# Patient Record
Sex: FEMALE | Race: WHITE | NOT HISPANIC OR LATINO | URBAN - METROPOLITAN AREA
[De-identification: names, ages, dates, MRNs, and addresses within clinical notes are randomized per-mention and may not be internally consistent; named-entity substitution may affect disease eponyms.]

---

## 2017-04-23 ENCOUNTER — INPATIENT (INPATIENT)
Facility: HOSPITAL | Age: 72
LOS: 3 days | Discharge: PSYCHIATRIC OR SUBSTANCE USE FACILITY | DRG: 918 | End: 2017-04-27
Attending: INTERNAL MEDICINE | Admitting: INTERNAL MEDICINE
Payer: MEDICARE

## 2017-04-23 VITALS
OXYGEN SATURATION: 99 % | RESPIRATION RATE: 20 BRPM | HEART RATE: 91 BPM | HEIGHT: 64 IN | SYSTOLIC BLOOD PRESSURE: 138 MMHG | WEIGHT: 160.06 LBS | TEMPERATURE: 98 F | DIASTOLIC BLOOD PRESSURE: 83 MMHG

## 2017-04-23 DIAGNOSIS — T39.1X2A POISONING BY 4-AMINOPHENOL DERIVATIVES, INTENTIONAL SELF-HARM, INITIAL ENCOUNTER: ICD-10-CM

## 2017-04-23 DIAGNOSIS — R69 ILLNESS, UNSPECIFIED: ICD-10-CM

## 2017-04-23 DIAGNOSIS — F31.63 BIPOLAR DISORDER, CURRENT EPISODE MIXED, SEVERE, WITHOUT PSYCHOTIC FEATURES: ICD-10-CM

## 2017-04-23 DIAGNOSIS — F10.10 ALCOHOL ABUSE, UNCOMPLICATED: ICD-10-CM

## 2017-04-23 DIAGNOSIS — F11.10 OPIOID ABUSE, UNCOMPLICATED: ICD-10-CM

## 2017-04-23 DIAGNOSIS — F32.2 MAJOR DEPRESSIVE DISORDER, SINGLE EPISODE, SEVERE WITHOUT PSYCHOTIC FEATURES: ICD-10-CM

## 2017-04-23 DIAGNOSIS — R79.89 OTHER SPECIFIED ABNORMAL FINDINGS OF BLOOD CHEMISTRY: ICD-10-CM

## 2017-04-23 DIAGNOSIS — F41.9 ANXIETY DISORDER, UNSPECIFIED: ICD-10-CM

## 2017-04-23 DIAGNOSIS — N17.9 ACUTE KIDNEY FAILURE, UNSPECIFIED: ICD-10-CM

## 2017-04-23 LAB
ALBUMIN SERPL ELPH-MCNC: 3.4 G/DL — SIGNIFICANT CHANGE UP (ref 3.3–5.2)
ALBUMIN SERPL ELPH-MCNC: 3.8 G/DL — SIGNIFICANT CHANGE UP (ref 3.3–5.2)
ALP SERPL-CCNC: 71 U/L — SIGNIFICANT CHANGE UP (ref 40–120)
ALP SERPL-CCNC: 83 U/L — SIGNIFICANT CHANGE UP (ref 40–120)
ALT FLD-CCNC: 116 U/L — HIGH
ALT FLD-CCNC: 265 U/L — HIGH
ANION GAP SERPL CALC-SCNC: 17 MMOL/L — SIGNIFICANT CHANGE UP (ref 5–17)
ANION GAP SERPL CALC-SCNC: 17 MMOL/L — SIGNIFICANT CHANGE UP (ref 5–17)
ANISOCYTOSIS BLD QL: SLIGHT — SIGNIFICANT CHANGE UP
APAP SERPL-MCNC: 92.3 UG/ML — HIGH (ref 10–26)
APPEARANCE UR: CLEAR — SIGNIFICANT CHANGE UP
APTT BLD: 29.7 SEC — SIGNIFICANT CHANGE UP (ref 27.5–37.4)
AST SERPL-CCNC: 214 U/L — HIGH
AST SERPL-CCNC: 539 U/L — HIGH
BASOPHILS # BLD AUTO: 0 K/UL — SIGNIFICANT CHANGE UP (ref 0–0.2)
BASOPHILS NFR BLD AUTO: 0.3 % — SIGNIFICANT CHANGE UP (ref 0–2)
BILIRUB DIRECT SERPL-MCNC: 0.2 MG/DL — SIGNIFICANT CHANGE UP (ref 0–0.3)
BILIRUB INDIRECT FLD-MCNC: 0.4 MG/DL — SIGNIFICANT CHANGE UP (ref 0.2–1)
BILIRUB SERPL-MCNC: 0.5 MG/DL — SIGNIFICANT CHANGE UP (ref 0.4–2)
BILIRUB SERPL-MCNC: 0.6 MG/DL — SIGNIFICANT CHANGE UP (ref 0.4–2)
BILIRUB UR-MCNC: NEGATIVE — SIGNIFICANT CHANGE UP
BUN SERPL-MCNC: 23 MG/DL — HIGH (ref 8–20)
BUN SERPL-MCNC: 23 MG/DL — HIGH (ref 8–20)
BURR CELLS BLD QL SMEAR: PRESENT — SIGNIFICANT CHANGE UP
CALCIUM SERPL-MCNC: 8.4 MG/DL — LOW (ref 8.6–10.2)
CALCIUM SERPL-MCNC: 9.3 MG/DL — SIGNIFICANT CHANGE UP (ref 8.6–10.2)
CHLORIDE SERPL-SCNC: 98 MMOL/L — SIGNIFICANT CHANGE UP (ref 98–107)
CHLORIDE SERPL-SCNC: 99 MMOL/L — SIGNIFICANT CHANGE UP (ref 98–107)
CO2 SERPL-SCNC: 22 MMOL/L — SIGNIFICANT CHANGE UP (ref 22–29)
CO2 SERPL-SCNC: 24 MMOL/L — SIGNIFICANT CHANGE UP (ref 22–29)
COLOR SPEC: YELLOW — SIGNIFICANT CHANGE UP
CREAT SERPL-MCNC: 1.82 MG/DL — HIGH (ref 0.5–1.3)
CREAT SERPL-MCNC: 2 MG/DL — HIGH (ref 0.5–1.3)
DIFF PNL FLD: ABNORMAL
EOSINOPHIL # BLD AUTO: 0.1 K/UL — SIGNIFICANT CHANGE UP (ref 0–0.5)
EOSINOPHIL NFR BLD AUTO: 2 % — SIGNIFICANT CHANGE UP (ref 0–5)
EPI CELLS # UR: SIGNIFICANT CHANGE UP
ETHANOL SERPL-MCNC: <10 MG/DL — SIGNIFICANT CHANGE UP
FERRITIN SERPL-MCNC: 316.1 NG/ML — HIGH (ref 11–306)
GLUCOSE SERPL-MCNC: 159 MG/DL — HIGH (ref 70–115)
GLUCOSE SERPL-MCNC: 169 MG/DL — HIGH (ref 70–115)
GLUCOSE UR QL: NEGATIVE MG/DL — SIGNIFICANT CHANGE UP
HCT VFR BLD CALC: 46.1 % — SIGNIFICANT CHANGE UP (ref 37–47)
HGB BLD-MCNC: 14.3 G/DL — SIGNIFICANT CHANGE UP (ref 12–16)
HYPOCHROMIA BLD QL: SLIGHT — SIGNIFICANT CHANGE UP
INR BLD: 1.37 RATIO — HIGH (ref 0.88–1.16)
IRON SATN MFR SERPL: 31 % — SIGNIFICANT CHANGE UP (ref 14–50)
IRON SATN MFR SERPL: 83 UG/DL — SIGNIFICANT CHANGE UP (ref 37–145)
KETONES UR-MCNC: ABNORMAL
LACTATE BLDV-MCNC: 2.3 MMOL/L — HIGH (ref 0.7–2)
LEUKOCYTE ESTERASE UR-ACNC: NEGATIVE — SIGNIFICANT CHANGE UP
LYMPHOCYTES # BLD AUTO: 1.5 K/UL — SIGNIFICANT CHANGE UP (ref 1–4.8)
LYMPHOCYTES # BLD AUTO: 21.3 % — SIGNIFICANT CHANGE UP (ref 20–55)
MACROCYTES BLD QL: SLIGHT — SIGNIFICANT CHANGE UP
MAGNESIUM SERPL-MCNC: 2.1 MG/DL — SIGNIFICANT CHANGE UP (ref 1.8–2.5)
MAGNESIUM SERPL-MCNC: 2.2 MG/DL — SIGNIFICANT CHANGE UP (ref 1.8–2.5)
MCHC RBC-ENTMCNC: 28.2 PG — SIGNIFICANT CHANGE UP (ref 27–31)
MCHC RBC-ENTMCNC: 31 G/DL — LOW (ref 32–36)
MCV RBC AUTO: 90.9 FL — SIGNIFICANT CHANGE UP (ref 81–99)
MONOCYTES # BLD AUTO: 0.6 K/UL — SIGNIFICANT CHANGE UP (ref 0–0.8)
MONOCYTES NFR BLD AUTO: 8.2 % — SIGNIFICANT CHANGE UP (ref 3–10)
NEUTROPHILS # BLD AUTO: 4.7 K/UL — SIGNIFICANT CHANGE UP (ref 1.8–8)
NEUTROPHILS NFR BLD AUTO: 68.1 % — SIGNIFICANT CHANGE UP (ref 37–73)
NITRITE UR-MCNC: NEGATIVE — SIGNIFICANT CHANGE UP
OSMOLALITY UR: 427 MOSM/KG — SIGNIFICANT CHANGE UP (ref 300–1000)
OVALOCYTES BLD QL SMEAR: SLIGHT — SIGNIFICANT CHANGE UP
PH UR: 5 — SIGNIFICANT CHANGE UP (ref 5–8)
PHOSPHATE SERPL-MCNC: 4.2 MG/DL — SIGNIFICANT CHANGE UP (ref 2.4–4.7)
PHOSPHATE SERPL-MCNC: 5.3 MG/DL — HIGH (ref 2.4–4.7)
PLAT MORPH BLD: NORMAL — SIGNIFICANT CHANGE UP
PLATELET # BLD AUTO: 259 K/UL — SIGNIFICANT CHANGE UP (ref 150–400)
POIKILOCYTOSIS BLD QL AUTO: SLIGHT — SIGNIFICANT CHANGE UP
POTASSIUM SERPL-MCNC: 3 MMOL/L — LOW (ref 3.5–5.3)
POTASSIUM SERPL-MCNC: 4.1 MMOL/L — SIGNIFICANT CHANGE UP (ref 3.5–5.3)
POTASSIUM SERPL-SCNC: 3 MMOL/L — LOW (ref 3.5–5.3)
POTASSIUM SERPL-SCNC: 4.1 MMOL/L — SIGNIFICANT CHANGE UP (ref 3.5–5.3)
POTASSIUM UR-SCNC: 87 MMOL/L — SIGNIFICANT CHANGE UP
PROT SERPL-MCNC: 6.3 G/DL — LOW (ref 6.6–8.7)
PROT SERPL-MCNC: 7.3 G/DL — SIGNIFICANT CHANGE UP (ref 6.6–8.7)
PROT UR-MCNC: NEGATIVE MG/DL — SIGNIFICANT CHANGE UP
PROTHROM AB SERPL-ACNC: 15.2 SEC — HIGH (ref 9.8–12.7)
RBC # BLD: 5.07 M/UL — SIGNIFICANT CHANGE UP (ref 4.4–5.2)
RBC # FLD: 18.2 % — HIGH (ref 11–15.6)
RBC BLD AUTO: ABNORMAL
RBC CASTS # UR COMP ASSIST: SIGNIFICANT CHANGE UP /HPF (ref 0–4)
SALICYLATES SERPL-MCNC: <2 MG/DL — LOW (ref 10–20)
SODIUM SERPL-SCNC: 138 MMOL/L — SIGNIFICANT CHANGE UP (ref 135–145)
SODIUM SERPL-SCNC: 139 MMOL/L — SIGNIFICANT CHANGE UP (ref 135–145)
SODIUM UR-SCNC: 36 MMOL/L — SIGNIFICANT CHANGE UP
SP GR SPEC: 1.01 — SIGNIFICANT CHANGE UP (ref 1.01–1.02)
TIBC SERPL-MCNC: 269 UG/DL — SIGNIFICANT CHANGE UP (ref 220–430)
TRANSFERRIN SERPL-MCNC: 188 MG/DL — LOW (ref 192–382)
UROBILINOGEN FLD QL: NEGATIVE MG/DL — SIGNIFICANT CHANGE UP
WBC # BLD: 6.9 K/UL — SIGNIFICANT CHANGE UP (ref 4.8–10.8)
WBC # FLD AUTO: 6.9 K/UL — SIGNIFICANT CHANGE UP (ref 4.8–10.8)
WBC UR QL: SIGNIFICANT CHANGE UP

## 2017-04-23 PROCEDURE — 90792 PSYCH DIAG EVAL W/MED SRVCS: CPT

## 2017-04-23 PROCEDURE — 99291 CRITICAL CARE FIRST HOUR: CPT

## 2017-04-23 PROCEDURE — 76700 US EXAM ABDOM COMPLETE: CPT | Mod: 26

## 2017-04-23 PROCEDURE — 99223 1ST HOSP IP/OBS HIGH 75: CPT

## 2017-04-23 PROCEDURE — 99223 1ST HOSP IP/OBS HIGH 75: CPT | Mod: AI

## 2017-04-23 RX ORDER — ACETYLCYSTEINE 200 MG/ML
11 VIAL (ML) MISCELLANEOUS ONCE
Qty: 0 | Refills: 0 | Status: COMPLETED | OUTPATIENT
Start: 2017-04-23 | End: 2017-04-23

## 2017-04-23 RX ORDER — ONDANSETRON 8 MG/1
4 TABLET, FILM COATED ORAL EVERY 6 HOURS
Qty: 0 | Refills: 0 | Status: DISCONTINUED | OUTPATIENT
Start: 2017-04-23 | End: 2017-04-27

## 2017-04-23 RX ORDER — ESOMEPRAZOLE MAGNESIUM 40 MG/1
1 CAPSULE, DELAYED RELEASE ORAL
Qty: 0 | Refills: 0 | COMMUNITY

## 2017-04-23 RX ORDER — ACETYLCYSTEINE 200 MG/ML
3.6 VIAL (ML) MISCELLANEOUS ONCE
Qty: 0 | Refills: 0 | Status: COMPLETED | OUTPATIENT
Start: 2017-04-23 | End: 2017-04-23

## 2017-04-23 RX ORDER — FERROUS SULFATE 325(65) MG
0 TABLET ORAL
Qty: 0 | Refills: 0 | COMMUNITY

## 2017-04-23 RX ORDER — SODIUM CHLORIDE 9 MG/ML
1000 INJECTION INTRAMUSCULAR; INTRAVENOUS; SUBCUTANEOUS
Qty: 0 | Refills: 0 | Status: DISCONTINUED | OUTPATIENT
Start: 2017-04-23 | End: 2017-04-25

## 2017-04-23 RX ORDER — ACETYLCYSTEINE 200 MG/ML
7 VIAL (ML) MISCELLANEOUS ONCE
Qty: 0 | Refills: 0 | Status: COMPLETED | OUTPATIENT
Start: 2017-04-23 | End: 2017-04-23

## 2017-04-23 RX ORDER — POTASSIUM CHLORIDE 20 MEQ
40 PACKET (EA) ORAL ONCE
Qty: 0 | Refills: 0 | Status: COMPLETED | OUTPATIENT
Start: 2017-04-23 | End: 2017-04-23

## 2017-04-23 RX ORDER — POTASSIUM CHLORIDE 20 MEQ
40 PACKET (EA) ORAL ONCE
Qty: 0 | Refills: 0 | Status: COMPLETED | OUTPATIENT
Start: 2017-04-23 | End: 2017-04-24

## 2017-04-23 RX ORDER — DILTIAZEM HCL 120 MG
120 CAPSULE, EXT RELEASE 24 HR ORAL DAILY
Qty: 0 | Refills: 0 | Status: DISCONTINUED | OUTPATIENT
Start: 2017-04-23 | End: 2017-04-27

## 2017-04-23 RX ADMIN — Medication 64.69 GRAM(S): at 19:21

## 2017-04-23 RX ADMIN — Medication 40 MILLIEQUIVALENT(S): at 22:08

## 2017-04-23 RX ADMIN — Medication 129.5 GRAM(S): at 12:59

## 2017-04-23 RX ADMIN — SODIUM CHLORIDE 75 MILLILITER(S): 9 INJECTION INTRAMUSCULAR; INTRAVENOUS; SUBCUTANEOUS at 15:05

## 2017-04-23 RX ADMIN — SODIUM CHLORIDE 75 MILLILITER(S): 9 INJECTION INTRAMUSCULAR; INTRAVENOUS; SUBCUTANEOUS at 21:41

## 2017-04-23 RX ADMIN — Medication 250 GRAM(S): at 11:14

## 2017-04-23 NOTE — ED ADULT NURSE NOTE - OBJECTIVE STATEMENT
71 year old female alert&ox3  comes to ED. c/o of overdose. as per patient. she moved to [place] and does not want to be here. as per patient. she does "not want to be alive anymore" pt. took klonopin, percocet, and ambien. as per daughter pt had a prescription filled April 19th of percocet 5/325mg 60 tabs and 14 tabs left. daughter unsure about the other bottles. as per daughter pt. is an alcoholic. pt. in no apparent distress. breathing even and unlabored. on cm. sating well on RA. will continue to monitor.

## 2017-04-23 NOTE — CONSULT NOTE ADULT - PROBLEM SELECTOR RECOMMENDATION 9
Pt. with modestly elevated transaminases. Will repeat liver chemistries serially. PT/INR also ordered to r/o coagulopathy. Pysch evaluation and follow up as this was a deliberate and not accidental overdose.
NAC IV Per Protocol,

## 2017-04-23 NOTE — ED BEHAVIORAL HEALTH ASSESSMENT NOTE - SUMMARY
Pt. is a 70 yo female BIBA after being found lethargic and somnolent s/p ingestion of #50 Percocet 5/325mg at 2200 last night as well as Ambien and Klonopin in a reported suicide gesture.  Pt. had moved from [place] 3 weeks ago, currently living with her daughter who she has been fighting with.  Pt. reports hx of anxiety and insomnia and was prescribed Klonopin and Ambien by a psychiatrist in [place].  Pt. somewhat confused about timeline of events but reports she had lived in [place] and got a DWI after she had several glasses of wine, drove to get pizza and  came back to find the police waiting for her.  She lost her license for a year and when she did get it back had to pass a breathalizer in her car  before she could drive.  She is tearful about this event, stating it started all her trouble, cost her lots of money in  fees and she has not recovered from the incident.  She was forced to come to [place] to live with her daughter.  She states her daughter watches her every move and criticizes her all the time so she felt she had no reason to live any longer.  Pt. denies any previous psychiatric hospitalizations.  She denies hx of homicidal ideation/plan/intent or A/V/H.

## 2017-04-23 NOTE — H&P ADULT - ASSESSMENT
This is 71Y W was brought to ed due to percocet overdosage. History is obtained from pt, daughter and ED provider. As per patient she moved form [place] about 3 weeks ago, living with her daughter, she been to mean to her that's why she took percoset last night plus Ambien and Klonipin. As per patient she took 12 tablets, as per family, there were 60 tablets of percoset, only 14 left in bottle. As per daughter she has h/o bipolar disorder, also she has h/o Suicidal attempt in past. At the time exam, pt looks depressed, not making eye contact, denied chest pain, SOB, abd. pain, nausea, vomiting, urinary and bowel complaints.    A/P    >Tylenol overdosage  >Suicidal attempt  >h/o psych comorbidities   >Alcohol abuse  >abnormal LFT   >SURINDER     Plan    admit to step down  Poison control notified by Ed physician, started on NAC, IVF   psych consult requested, 1:1  INR and drug screen pending  abdominal u/s - liver and renal   hepatitis panel  f/u LFT, BMP, INR, tylenols level  ekg- qtc 451  holding psych medication for now - defer to psychiatry   renal consult requested

## 2017-04-23 NOTE — ED PROVIDER NOTE - PROGRESS NOTE DETAILS
Labs as noted.  Case d/w Hospitalist/Court Packer and will admit to Stepdown on 1:1.  Elevated Creat.as noted,  Nara d/w Renal/Dr. Pedraza and will see pt in consult

## 2017-04-23 NOTE — ED BEHAVIORAL HEALTH ASSESSMENT NOTE - HPI (INCLUDE ILLNESS QUALITY, SEVERITY, DURATION, TIMING, CONTEXT, MODIFYING FACTORS, ASSOCIATED SIGNS AND SYMPTOMS)
Pt. is a 72 yo female BIBA after being found lethargic and somnolent s/p ingestion of #50 Percocet 5/325mg at 2200 last night as well as Ambien and Klonopin in a reported suicide gesture.  Pt. had moved from [place] 3 weeks ago, currently living with her daughter who she has been fighting with.  Pt. reports hx of anxiety and insomnia and was prescribed Klonopin and Ambien by a psychiatrist in [place].  Pt. somewhat confused about timeline of events but reports she had lived in [place] and got a DWI after she had several glasses of wine, drove to get pizza and  came back to find the police waiting for her.  She lost her license for a year and when she did get it back had to pass a breathalizer in her car  before she could drive.  She is tearful about this event, stating it started all her trouble, cost her lots of money in  fees and she has not recovered from the incident.  She was forced to come to [place] to live with her daughter.  She states her daughter watches her every move and criticizes her all the time so she felt she had no reason to live any longer.  Pt. denies any previous psychiatric hospitalizations.  She denies hx of homicidal ideation/plan/intent or A/V/H. Pt. is a 72 yo female BIBA after being found lethargic and somnolent s/p ingestion of #50 Percocet 5/325mg at 2200 last night as well as Ambien and Klonopin in a reported suicide gesture.  Pt. had moved from [place] 3 weeks ago, currently living with her daughter who she has been fighting with.  Pt. reports hx of anxiety and insomnia and was prescribed Klonopin and Ambien by a psychiatrist in [place].  Pt. somewhat confused about timeline of events but reports she had lived in [place] and got a DWI after she had several glasses of wine, drove to get pizza and  came back to find the police waiting for her.  She lost her license for a year and when she did get it back had to pass a breathalizer in her car  before she could drive.  She is tearful about this event, stating it started all her trouble, cost her lots of money in  fees and she has not recovered from the incident.  She was forced to come to [place] to live with her daughter.  She states her daughter watches her every move and criticizes her all the time so she felt she had no reason to live any longer.  Pt. denies any previous psychiatric hospitalizations.  She denies hx of homicidal ideation/plan/intent or A/V/H.  Pt's daughter reports pt. has a long hx of bipolar disorder, alcohol abuse and opiate dependence.  Daughter reports pt. is extremely unpredictable, demanding, irritable and in denial about her substance abuse.  Pt. has six children but only her daughter Mone would take her in.  Daughter reports that she has to monitor pt's medications and alcohol use and pt. has cursed and yelled at her if she tells her that she has had too much to drink.

## 2017-04-23 NOTE — ED ADULT TRIAGE NOTE - CHIEF COMPLAINT QUOTE
Patient with intentional overdose of klonopin, percocet, and ambien. Had prescription filled 4/20 of percocet 5/325mg 60 tabs and 10 tabs left in bottle as per EMS. Recent shoulder surgery. Awake, alert, and speaking in triage. Narcan 2mg given intranasally. 4mg zofran IM given. Nausea present prior to narcan. As per EMS, patient difficult to arouse and snourous respirations.

## 2017-04-23 NOTE — ED BEHAVIORAL HEALTH ASSESSMENT NOTE - SUICIDE RISK FACTORS
Hopelessness/Highly impulsive behavior/Family history of suicide/Anhedonia/Substance abuse/dependence/Perceived burden on family and others/Agitation/severe anxiety/Access to means (pills, firearms, etc.)

## 2017-04-23 NOTE — ED BEHAVIORAL HEALTH ASSESSMENT NOTE - AXIS IV
Problems with primary support/Problem related to social environment/Housing problems/Problems with interaction with legal system

## 2017-04-23 NOTE — H&P ADULT - HISTORY OF PRESENT ILLNESS
This is 71Y W was brought to ed due to percocet overdosage. History is obtained from pt, daughter and ED provider. As per patient she moved form [place] about 3 weeks ago, living with her daughter, she been to mean to her that's why she took percoset last night plus Ambien and Klonipin. As per patient she took 12 tablets, as per family, there were 60 tablets of percoset, only 14 left in bottle. As per daughter she has h/o bipolar disorder, also she has h/o Suicidal attempt in past. At the time exam, pt looks depressed, not making eye contact, denied chest pain, SOB, abd. pain, nausea, vomiting, urinary and bowel complaints.    labs showed Tylenol 92.3, AST: 214  ALT: 116, lactate 2.3, BUN 23.0, Cr: 2.0

## 2017-04-23 NOTE — ED BEHAVIORAL HEALTH ASSESSMENT NOTE - PRIMARY DX
Deferred diagnosis on axis II Major depressive disorder, severe Bipolar disorder, curr episode mixed, severe, w/o psychotic features

## 2017-04-23 NOTE — ED BEHAVIORAL HEALTH ASSESSMENT NOTE - DETAILS
Pt. ingested #50 Percocet 5/325, Klonopin, Ambien Mother attempted suicide To be followed by Psychiatry Pt. medically admitted

## 2017-04-23 NOTE — H&P ADULT - PMH
Bipolar 1 disorder    Carpal tunnel syndrome    Rotator cuff dysfunction    Suicidal behavior with attempted self-injury

## 2017-04-23 NOTE — CONSULT NOTE ADULT - PROBLEM SELECTOR RECOMMENDATION 2
Likely secondary to APAP OD but hepatitis and chronic liver disease serologies ordered. Abdominal sonogram also ordered for the AM to visualize her liver. Repeat liver chemistries ordered for tomorrow AM.
IVF  US Kidneys & Urine studies,  Trend UO, Scr.,

## 2017-04-23 NOTE — H&P ADULT - NSHPPHYSICALEXAM_GEN_ALL_CORE
ICU Vital Signs Last 24 Hrs  T(C): 36.7, Max: 36.7 (04-23 @ 08:47)  T(F): 98, Max: 98 (04-23 @ 08:47)  HR: 91 (91 - 91)  BP: 138/83 (138/83 - 138/83)  BP(mean): --  ABP: --  ABP(mean): --  RR: 20 (20 - 20)  SpO2: 99% (99% - 99%)

## 2017-04-23 NOTE — ED PROVIDER NOTE - CONSTITUTIONAL, MLM
normal... Well appearing, well nourished, elderly F awake, alert, oriented to person, place, time/situation and in no apparent distress.

## 2017-04-23 NOTE — CONSULT NOTE ADULT - SUBJECTIVE AND OBJECTIVE BOX
Renal :                * Papillary necrosis, similar to that seen with analgesic mixtures, is present in at least some affected patients .This is in contrast to the tubular injury and acute, reversible renal failure that can be induced by an acute acetaminophen </contents/acetaminophen-paracetamol-drug-information?source=see_link> overdose.    Patient is a 71y old  Female who presented w. Acetaminophen Overdose,    HPI: Relocated from [facility] 4 weeks ago,  + Chronic Pain,  History obtaine dfrom the GD,      PAST MEDICAL & SURGICAL HISTORY:  Rotator cuff dysfunction  Carpal tunnel syndrome      FAMILY HISTORY: NR      Social History: ? ETOH,    MEDICATIONS  (STANDING):    acetylcysteine IVPB 3.6Gram(s) IV Intermittent once  acetylcysteine IVPB 7Gram(s) IV Intermittent once    Allergies    Allergy Status Unknown    REVIEW OF SYSTEMS:    CONSTITUTIONAL: No fever, weight loss, or fatigue  EYES: No eye pain, visual disturbances, or discharge  NECK: No pain or stiffness  BREASTS: No pain, masses, or nipple discharge  RESPIRATORY: No cough, wheezing, chills or hemoptysis; No shortness of breath  CARDIOVASCULAR: No chest pain, palpitations, dizziness, or leg swelling  GASTROINTESTINAL: No abdominal or epigastric pain. No nausea, vomiting, or hematemesis; No diarrhea or constipation. No melena or hematochezia.  GENITOURINARY: No dysuria, frequency, hematuria, or incontinence  NEUROLOGICAL: No headaches, memory loss, loss of strength, numbness, or tremors  SKIN: No itching, burning, rashes, or lesions   LYMPH NODES: No enlarged glands        Vital Signs Last 24 Hrs  T(C): 36.7, Max: 36.7 (04-23 @ 08:47)  T(F): 98, Max: 98 (04-23 @ 08:47)  HR: 91 (91 - 91)  BP: 138/83 (138/83 - 138/83)  BP(mean): --  RR: 20 (20 - 20)  SpO2: 99% (99% - 99%)    PHYSICAL EXAM:    GENERAL: NAD, Frail & Depressed,  HEAD:  Atraumatic, Normocephalic  EYES: EOMI, PERRLA, conjunctiva and sclera clear  NECK: Supple, No JVD, Normal thyroid  NERVOUS SYSTEM:  Alert & Oriented X3,   CHEST/LUNG: Clear to percussion bilaterally; No rales, rhonchi, wheezing, or rubs  HEART: ST,  ABDOMEN: Soft, Nontender, Nondistended; Bowel sounds present  EXTREMITIES:  2+ Peripheral Pulses, No clubbing, cyanosis, or edema  LYMPH: No lymphadenopathy noted  SKIN: No rashes or lesions      LABS:                        14.3   6.9   )-----------( 259      ( 23 Apr 2017 09:20 )             46.1     04-23    138  |  99  |  23.0<H>  ----------------------------<  169<H>  4.1   |  22.0  |  2.00<H>    Ca    9.3      23 Apr 2017 10:34  Phos  5.3     04-23  Mg     2.2     04-23    TPro  7.3  /  Alb  3.8  /  TBili  0.5  /  DBili  x   /  AST  214<H>  /  ALT  116<H>  /  AlkPhos  83   (04-23 )    Magnesium, Serum: 2.2 mg/dL (04-23 @ 10:34)  Phosphorus Level, Serum: 5.3 mg/dL (04-23 @ 10:34)    RADIOLOGY & ADDITIONAL TESTS:    Patient is a 71y old  Female who presents with a chief complaint of     HPI:      PAST MEDICAL & SURGICAL HISTORY:  Rotator cuff dysfunction  Carpal tunnel syndrome      FAMILY HISTORY:      Social History:    MEDICATIONS  (STANDING):  acetylcysteine IVPB 3.6Gram(s) IV Intermittent once  acetylcysteine IVPB 7Gram(s) IV Intermittent once    MEDICATIONS  (PRN):      Allergies    Allergy Status Unknown    Intolerances        REVIEW OF SYSTEMS:    CONSTITUTIONAL: No fever, weight loss, or fatigue  EYES: No eye pain, visual disturbances, or discharge  ENMT:  No difficulty hearing, tinnitus, vertigo; No sinus or throat pain  NECK: No pain or stiffness  BREASTS: No pain, masses, or nipple discharge  RESPIRATORY: No cough, wheezing, chills or hemoptysis; No shortness of breath  CARDIOVASCULAR: No chest pain, palpitations, dizziness, or leg swelling  GASTROINTESTINAL: No abdominal or epigastric pain. No nausea, vomiting, or hematemesis; No diarrhea or constipation. No melena or hematochezia.  GENITOURINARY: No dysuria, frequency, hematuria, or incontinence  NEUROLOGICAL: No headaches, memory loss, loss of strength, numbness, or tremors  SKIN: No itching, burning, rashes, or lesions   LYMPH NODES: No enlarged glands  ENDOCRINE: No heat or cold intolerance; No hair loss  MUSCULOSKELETAL: No joint pain or swelling; No muscle, back, or extremity pain  PSYCHIATRIC: No depression, anxiety, mood swings, or difficulty sleeping  HEME/LYMPH: No easy bruising, or bleeding gums  ALLERY AND IMMUNOLOGIC: No hives or eczema      Vital Signs Last 24 Hrs  T(C): 36.7, Max: 36.7 (04-23 @ 08:47)  T(F): 98, Max: 98 (04-23 @ 08:47)  HR: 91 (91 - 91)  BP: 138/83 (138/83 - 138/83)  BP(mean): --  RR: 20 (20 - 20)  SpO2: 99% (99% - 99%)    PHYSICAL EXAM:    GENERAL: NAD, well-groomed, well-developed  HEAD:  Atraumatic, Normocephalic  EYES: EOMI, PERRLA, conjunctiva and sclera clear  ENMT: No tonsillar erythema, exudates, or enlargement; Moist mucous membranes, Good dentition, No lesions  NECK: Supple, No JVD, Normal thyroid  NERVOUS SYSTEM:  Alert & Oriented X3, Good concentration; Motor Strength 5/5 B/L upper and lower extremities; DTRs 2+ intact and symmetric  CHEST/LUNG: Clear to percussion bilaterally; No rales, rhonchi, wheezing, or rubs  HEART: Regular rate and rhythm; No murmurs, rubs, or gallops  ABDOMEN: Soft, Nontender, Nondistended; Bowel sounds present  EXTREMITIES:  2+ Peripheral Pulses, No clubbing, cyanosis, or edema  LYMPH: No lymphadenopathy noted  SKIN: No rashes or lesions      LABS:                        14.3   6.9   )-----------( 259      ( 23 Apr 2017 09:20 )             46.1     04-23    138  |  99  |  23.0<H>  ----------------------------<  169<H>  4.1   |  22.0  |  2.00<H>    Ca    9.3      23 Apr 2017 10:34  Phos  5.3     04-23  Mg     2.2     04-23    TPro  7.3  /  Alb  3.8  /  TBili  0.5  /  DBili  x   /  AST  214<H>  /  ALT  116<H>  /  AlkPhos  83  04-23        Magnesium, Serum: 2.2 mg/dL (04-23 @ 10:34)  Phosphorus Level, Serum: 5.3 mg/dL (04-23 @ 10:34)      RADIOLOGY & ADDITIONAL TESTS:    CXR & US Kidneys - P :
Patient is a 71y old  Female who presents with a chief complaint of  Tylenol OD.    HPI: 71 year old who was referred for GI evaluation of deliberate APAP overdose with modestly elevated transaminases. Pt. is presently lethargic but is arousable and states that she has had no previous GI testing or h/o chronic liver disease. No GI complaints are presently offered.      REVIEW OF SYSTEMS:  Constitutional: No fever, weight loss or fatigue  ENMT:  No difficulty hearing, tinnitus, vertigo; No sinus or throat pain  Respiratory: No cough, wheezing, chills or hemoptysis  Cardiovascular: No chest pain, palpitations, dizziness or leg swelling  Gastrointestinal: No abdominal or epigastric pain. No nausea, vomiting or hematemesis; No diarrhea or constipation. No melena or hematochezia.  Skin: No itching, burning, rashes or lesions   Musculoskeletal: No joint pain or swelling; No muscle, back or extremity pain  Patient has no cardiopulmonary, peripheral vascular, musculoskeletal, dermatological, neurological, or gynecological symptoms or complaints at this time    PAST MEDICAL & SURGICAL HISTORY:  Suicidal behavior with attempted self-injury  Bipolar 1 disorder  Rotator cuff dysfunction  Carpal tunnel syndrome      FAMILY HISTORY:  Family history of diabetes mellitus (Father, Mother)      SOCIAL HISTORY:  Smoking Status: [ x] Current, [ ] Former, [ ] Never  Pack Years: Unknown. No h/o ETOH or drug abuse.    MEDICATIONS:  MEDICATIONS  (STANDING):  acetylcysteine IVPB 7Gram(s) IV Intermittent once  sodium chloride 0.9%. 1000milliLiter(s) IV Continuous <Continuous>  diltiazem   CD 120milliGRAM(s) Oral daily    MEDICATIONS  (PRN):  ondansetron Injectable 4milliGRAM(s) IV Push every 6 hours PRN Nausea and/or Vomiting  LORazepam   Injectable 1milliGRAM(s) IV Push every 1 hour PRN CIWA-Ar score 8 or greater      Allergies    Allergy Status Unknown    Intolerances        Vital Signs Last 24 Hrs  T(C): 36.7, Max: 36.7 (04-23 @ 08:47)  T(F): 98, Max: 98 (04-23 @ 08:47)  HR: 91 (91 - 91)  BP: 138/83 (138/83 - 138/83)  BP(mean): --  RR: 20 (20 - 20)  SpO2: 99% (99% - 99%)        PHYSICAL EXAM:    General: Well developed; well nourished; in no acute distress  HEENT: MMM, conjunctiva and sclera anicteric  Lungs: Clear  Cor: RRR S1, S2 only  Gastrointestinal: Soft, non-tender non-distended; Normal bowel sounds; No rebound or guarding or HSM.  TOM: not done at this time.  Extremities: Normal range of motion, No clubbing, cyanosis or edema  Neurological: Alert and oriented x3,   Skin: Warm and dry. No obvious rash      LABS:                        14.3   6.9   )-----------( 259      ( 23 Apr 2017 09:20 )             46.1     04-23    138  |  99  |  23.0<H>  ----------------------------<  169<H>  4.1   |  22.0  |  2.00<H>    Ca    9.3      23 Apr 2017 10:34  Phos  5.3     04-23  Mg     2.2     04-23    TPro  7.3  /  Alb  3.8  /  TBili  0.5  /  DBili  x   /  AST  214<H>  /  ALT  116<H>  /  AlkPhos  83  04-23          RADIOLOGY & ADDITIONAL STUDIES:

## 2017-04-23 NOTE — ED PROVIDER NOTE - CRITICAL CARE PROVIDED
direct patient care (not related to procedure)/consultation with other physicians/interpretation of diagnostic studies/consult w/ pt's family directly relating to pts condition/additional history taking/documentation

## 2017-04-23 NOTE — ED PROVIDER NOTE - MEDICAL DECISION MAKING DETAILS
Will check labs, tox, BAL and will start Acetadote empirically based on amt of Percocet (acetaminophen) ingested and pt will require 1:1.  Case d/w [facility] poison Control and agree with treatment plan

## 2017-04-23 NOTE — CONSULT NOTE ADULT - PROBLEM SELECTOR PROBLEM 1
Tylenol toxicity, intentional self-harm, initial encounter
Tylenol toxicity, intentional self-harm, initial encounter

## 2017-04-23 NOTE — ED PROVIDER NOTE - OBJECTIVE STATEMENT
72 yo F presents to ED via EMS after being found lethargic and somnolent after taking #50 Percocet 5/325 mg at 2200 last evening , as well as Ambien and Klonopin between 5-10 tabs each in reported suicide gesture.  Pt now awake and alert and states she took overdose because she did want to live any longer.  Pt states she moved up from the [facility] 3 weeks ago and her daughter has been yelling at her.  Pt denies any previous psych hx.  Pt had recent carpal tunnel and shoulder surgery and was prescribed Percocet for pain

## 2017-04-23 NOTE — H&P ADULT - FAMILY HISTORY
Father  Still living? Unknown  Family history of diabetes mellitus, Age at diagnosis: Age Unknown     Mother  Still living? Unknown  Family history of diabetes mellitus, Age at diagnosis: Age Unknown

## 2017-04-24 DIAGNOSIS — R79.89 OTHER SPECIFIED ABNORMAL FINDINGS OF BLOOD CHEMISTRY: ICD-10-CM

## 2017-04-24 DIAGNOSIS — T14.91 SUICIDE ATTEMPT: ICD-10-CM

## 2017-04-24 LAB
ALBUMIN SERPL ELPH-MCNC: 3.1 G/DL — LOW (ref 3.3–5.2)
ALBUMIN SERPL ELPH-MCNC: 3.7 G/DL — SIGNIFICANT CHANGE UP (ref 3.3–5.2)
ALP SERPL-CCNC: 88 U/L — SIGNIFICANT CHANGE UP (ref 40–120)
ALT FLD-CCNC: 485 U/L — HIGH
AMPHET UR-MCNC: NEGATIVE — SIGNIFICANT CHANGE UP
ANION GAP SERPL CALC-SCNC: 14 MMOL/L — SIGNIFICANT CHANGE UP (ref 5–17)
ANION GAP SERPL CALC-SCNC: 19 MMOL/L — HIGH (ref 5–17)
APAP SERPL-MCNC: <7.5 UG/ML — LOW (ref 10–26)
APPEARANCE UR: CLEAR — SIGNIFICANT CHANGE UP
APTT BLD: 32.8 SEC — SIGNIFICANT CHANGE UP (ref 27.5–37.4)
AST SERPL-CCNC: 827 U/L — HIGH
BARBITURATES UR SCN-MCNC: NEGATIVE — SIGNIFICANT CHANGE UP
BENZODIAZ UR-MCNC: POSITIVE
BILIRUB SERPL-MCNC: 1 MG/DL — SIGNIFICANT CHANGE UP (ref 0.4–2)
BILIRUB UR-MCNC: NEGATIVE — SIGNIFICANT CHANGE UP
BUN SERPL-MCNC: 17 MG/DL — SIGNIFICANT CHANGE UP (ref 8–20)
BUN SERPL-MCNC: 21 MG/DL — HIGH (ref 8–20)
CALCIUM SERPL-MCNC: 9 MG/DL — SIGNIFICANT CHANGE UP (ref 8.6–10.2)
CALCIUM SERPL-MCNC: 9.6 MG/DL — SIGNIFICANT CHANGE UP (ref 8.6–10.2)
CHLORIDE SERPL-SCNC: 102 MMOL/L — SIGNIFICANT CHANGE UP (ref 98–107)
CHLORIDE SERPL-SCNC: 103 MMOL/L — SIGNIFICANT CHANGE UP (ref 98–107)
CO2 SERPL-SCNC: 20 MMOL/L — LOW (ref 22–29)
CO2 SERPL-SCNC: 20 MMOL/L — LOW (ref 22–29)
COCAINE METAB.OTHER UR-MCNC: NEGATIVE — SIGNIFICANT CHANGE UP
COLOR SPEC: YELLOW — SIGNIFICANT CHANGE UP
CREAT SERPL-MCNC: 1.07 MG/DL — SIGNIFICANT CHANGE UP (ref 0.5–1.3)
CREAT SERPL-MCNC: 1.65 MG/DL — HIGH (ref 0.5–1.3)
DIFF PNL FLD: ABNORMAL
EPI CELLS # UR: ABNORMAL
GLUCOSE SERPL-MCNC: 126 MG/DL — HIGH (ref 70–115)
GLUCOSE SERPL-MCNC: 129 MG/DL — HIGH (ref 70–115)
GLUCOSE UR QL: NEGATIVE MG/DL — SIGNIFICANT CHANGE UP
HAV IGM SER-ACNC: SIGNIFICANT CHANGE UP
HBV CORE IGM SER-ACNC: SIGNIFICANT CHANGE UP
HBV SURFACE AG SER-ACNC: SIGNIFICANT CHANGE UP
HCT VFR BLD CALC: 46.1 % — SIGNIFICANT CHANGE UP (ref 37–47)
HCV AB S/CO SERPL IA: 0.12 S/CO — SIGNIFICANT CHANGE UP
HCV AB SERPL-IMP: SIGNIFICANT CHANGE UP
HGB BLD-MCNC: 15 G/DL — SIGNIFICANT CHANGE UP (ref 12–16)
INR BLD: 1.44 RATIO — HIGH (ref 0.88–1.16)
KETONES UR-MCNC: ABNORMAL
LACTATE SERPL-SCNC: 1.3 MMOL/L — SIGNIFICANT CHANGE UP (ref 0.5–2)
LEUKOCYTE ESTERASE UR-ACNC: ABNORMAL
MAGNESIUM SERPL-MCNC: 2 MG/DL — SIGNIFICANT CHANGE UP (ref 1.8–2.5)
MCHC RBC-ENTMCNC: 28.7 PG — SIGNIFICANT CHANGE UP (ref 27–31)
MCHC RBC-ENTMCNC: 32.5 G/DL — SIGNIFICANT CHANGE UP (ref 32–36)
MCV RBC AUTO: 88.1 FL — SIGNIFICANT CHANGE UP (ref 81–99)
METHADONE UR-MCNC: NEGATIVE — SIGNIFICANT CHANGE UP
NITRITE UR-MCNC: NEGATIVE — SIGNIFICANT CHANGE UP
OPIATES UR-MCNC: NEGATIVE — SIGNIFICANT CHANGE UP
PCP SPEC-MCNC: SIGNIFICANT CHANGE UP
PCP UR-MCNC: NEGATIVE — SIGNIFICANT CHANGE UP
PH UR: 5 — SIGNIFICANT CHANGE UP (ref 5–8)
PHOSPHATE SERPL-MCNC: 1.7 MG/DL — LOW (ref 2.4–4.7)
PHOSPHATE SERPL-MCNC: 2.8 MG/DL — SIGNIFICANT CHANGE UP (ref 2.4–4.7)
PLATELET # BLD AUTO: 285 K/UL — SIGNIFICANT CHANGE UP (ref 150–400)
POTASSIUM SERPL-MCNC: 3.4 MMOL/L — LOW (ref 3.5–5.3)
POTASSIUM SERPL-MCNC: 3.4 MMOL/L — LOW (ref 3.5–5.3)
POTASSIUM SERPL-SCNC: 3.4 MMOL/L — LOW (ref 3.5–5.3)
POTASSIUM SERPL-SCNC: 3.4 MMOL/L — LOW (ref 3.5–5.3)
PROT SERPL-MCNC: 6.9 G/DL — SIGNIFICANT CHANGE UP (ref 6.6–8.7)
PROT UR-MCNC: 15 MG/DL
PROTHROM AB SERPL-ACNC: 16 SEC — HIGH (ref 9.8–12.7)
RBC # BLD: 5.23 M/UL — HIGH (ref 4.4–5.2)
RBC # FLD: 16.7 % — HIGH (ref 11–15.6)
RBC CASTS # UR COMP ASSIST: ABNORMAL /HPF (ref 0–4)
SODIUM SERPL-SCNC: 137 MMOL/L — SIGNIFICANT CHANGE UP (ref 135–145)
SODIUM SERPL-SCNC: 141 MMOL/L — SIGNIFICANT CHANGE UP (ref 135–145)
SP GR SPEC: 1.02 — SIGNIFICANT CHANGE UP (ref 1.01–1.02)
THC UR QL: NEGATIVE — SIGNIFICANT CHANGE UP
UROBILINOGEN FLD QL: NEGATIVE MG/DL — SIGNIFICANT CHANGE UP
WBC # BLD: 9.8 K/UL — SIGNIFICANT CHANGE UP (ref 4.8–10.8)
WBC # FLD AUTO: 9.8 K/UL — SIGNIFICANT CHANGE UP (ref 4.8–10.8)
WBC UR QL: ABNORMAL

## 2017-04-24 PROCEDURE — 99233 SBSQ HOSP IP/OBS HIGH 50: CPT

## 2017-04-24 PROCEDURE — 99232 SBSQ HOSP IP/OBS MODERATE 35: CPT

## 2017-04-24 RX ORDER — PANTOPRAZOLE SODIUM 20 MG/1
40 TABLET, DELAYED RELEASE ORAL
Qty: 0 | Refills: 0 | Status: DISCONTINUED | OUTPATIENT
Start: 2017-04-24 | End: 2017-04-27

## 2017-04-24 RX ORDER — POTASSIUM CHLORIDE 20 MEQ
40 PACKET (EA) ORAL ONCE
Qty: 0 | Refills: 0 | Status: COMPLETED | OUTPATIENT
Start: 2017-04-24 | End: 2017-04-24

## 2017-04-24 RX ORDER — ACETYLCYSTEINE 200 MG/ML
7 VIAL (ML) MISCELLANEOUS ONCE
Qty: 0 | Refills: 0 | Status: COMPLETED | OUTPATIENT
Start: 2017-04-24 | End: 2017-04-24

## 2017-04-24 RX ADMIN — Medication 120 MILLIGRAM(S): at 06:27

## 2017-04-24 RX ADMIN — Medication 40 MILLIEQUIVALENT(S): at 00:20

## 2017-04-24 RX ADMIN — ONDANSETRON 4 MILLIGRAM(S): 8 TABLET, FILM COATED ORAL at 18:00

## 2017-04-24 RX ADMIN — Medication 64.69 GRAM(S): at 15:05

## 2017-04-24 RX ADMIN — Medication 40 MILLIEQUIVALENT(S): at 12:22

## 2017-04-24 RX ADMIN — PANTOPRAZOLE SODIUM 40 MILLIGRAM(S): 20 TABLET, DELAYED RELEASE ORAL at 17:42

## 2017-04-24 NOTE — PROGRESS NOTE ADULT - ASSESSMENT
This is 71Y W was brought to ed due to percocet overdosage. As per patient she moved form [place] about 3 weeks ago, living with her daughter, she been to mean to her that's why she took percoset last night plus Ambien and Klonipin. As per patient she took 12 tablets, as per family, there were 60 tablets of percoset, only 14 left in bottle.    A/P    >Tylenol overdosage  >Suicidal attempt  >h/o psych comorbidities   >Alcohol abuse  >abnormal LFT   >SURINDER     Plan    appreciate psych and GI input, LFT trending up, normal bilirubin  repeat tylenol level <7.5   cont. NAC as LFT are trending up  cont. 1:1  INR 1.44  abdominal u/s - liver and renal   hepatitis panel pending   psych medication as per psychiatry   improving renal function  Liver u/s - Increased liver echogenicity. Small kidneys without hydronephrosis.  PPI  f/u LFT, INR, BMP This is 71Y W was brought to ed due to percocet overdosage. As per patient she moved form [place] about 3 weeks ago, living with her daughter, she been to mean to her that's why she took percoset last night plus Ambien and Klonipin. As per patient she took 12 tablets, as per family, there were 60 tablets of percoset, only 14 left in bottle.    A/P    >Tylenol overdosage  >Suicidal attempt  >h/o psych comorbidities   >Alcohol abuse  >abnormal LFT   >SURINDER - improving   >Hypokalemia     Plan    appreciate psych and GI input, LFT trending up, normal bilirubin  repeat tylenol level <7.5   cont. NAC as LFT are trending up  cont. 1:1  INR 1.44  abdominal u/s - liver and renal   hepatitis panel pending   psych medication as per psychiatry   improving renal function  Liver u/s - Increased liver echogenicity. Small kidneys without hydronephrosis.  PPI, potassium supplement   f/u LFT, INR, BMP

## 2017-04-24 NOTE — SBIRT NOTE. - NSSBIRTSERVICES_GEN_A_ED_FT
Provided SBIRT services: Full screen Negative. Positive reinforcement provided given patient currently within healthy guidelines. Pt reports no drug use, and 1-2 drinks per day  Audit Score:4  DAST Score:0  Duration = 10 Minutes

## 2017-04-24 NOTE — PROGRESS NOTE ADULT - ASSESSMENT
Tylenol OD.  Normal baseline hepatic synthetic function.    Moderate elevations of Transaminitis. Ty level down to zero. Normal Bili and AP and Coags.    Mild diffuse abd tenderness, unclear etiology. Will refeed and trend BW.

## 2017-04-24 NOTE — ED ADULT NURSE REASSESSMENT NOTE - NS ED NURSE REASSESS COMMENT FT1
as per hospitalist Dr Yun, give second dose of 7G IV due to increasing LFT's since arrival to ED. awaiting bed assignment, pt remains awake and alert with NSR on monitor.
family at bedside, advising this RN that pt has opiate "problems" and that she should not be given narcotics. states she has had drinking problems, as well as manic/depressive like episodes. states pt has been "nasty and mean for a while now" and "refuses to take care of herself." daughter states pt had similar suicide attempt while living in [place]. daughter is current caretaker for the past 1 month since patient moved in with her. -- NSR on monitor, VSS, 1:1 at bedside, jolley cath draining clear yellow urine.
pt becoming increasingly agitated but able to re-orient and calm pt down. pt alert to person and place. Dr Live, covering hospitalist, made aware of mental status changes. also made aware of increasing LFT levels. next lab draw scheduled for 4am, Dr Live aware, no action taken at this time for agitation or confusion. attempting to call step down RN for report and transport to floor. remains on 1:1 with stable vital signs, jolley cath in place draining clear yellow urine.
pt remains awake and alert, calm and cooperative. CIWA 0 as charted. NSR on cardiac monitor with stable vital signs. pt remains on 1:1. awaiting bed assignment. Acetylcystine still infusing as ordered. Dr Yun, hospitalist, previously at bedside for daily eval. aware that subsequent dose of Acetylcystine will be given upon completion of current. IV site changed to right hand as charted and remains patent. pt with even and unlabored resps, will continue to monitor.
pt remains awake, alert to person and place at this time with period of uncooperation. able to re-orient and calm pt when she becomes anxious and uncooperative. pt VSS, Acetylcystine infusing on IV pump as ordered. IV site patent. jolley cath draining clear yellow urine. remains on 1:1. awaiting bed assignment on step down unit.
pt remains awake, intermittently agitated but able to be reassured. c/o epigastric pain, med given as ordered earlier (rescheduled due to pt refusing). pt with NSR on monitor, EKG strip in chart. pt tearful, c.o pain. IV site patent with Acetylcystine infusing as ordered. awaiting bed assignment. remains on 1:1 observation.
report received during shift change at this time, pt is lying in stretcher in no apparent distress, awake and alert with 1:1 at bedside. pt showing NSR on cardiac monitor with stable vital signs. pt skin warm dry and int act, jolley cath draining clear yellow urine, 200cc noted to bag. IV site patent, Mucomyst infusing on IV pump as ordered. second dose of 7 G Mucomyst ordered by Dr Yun, awaiting call back for clarification. pt with even and unlabored resps, awaiting bed assignment, will monitor.
Potassium level is 3.0 MD made aware and ordered Potassium given, continuous cardiac monitoring in progress shows A Fib, 1:1 at bed side, will continue to monitor.
Pt is sleeping in NAD, not in acute distress at this time, IVF in progress site is unremarkable, kept bed in low position with side rails up, 1:1 at bed side, will continue to monitor.
Received Pt awake alert and oriented x 3 respiration even and unlabored, abdomen soft and non tender, skin warm and dry color good, Acetylcysteine infusion in progress, site is unremarkable, Pt states that she did not urinate, since morning. Dr Flores made aware, advised to bladder scan, awaiting for bladder scan machine from the floor, 1:1 at bed side, will continue to monitor.
Straight cath done and drained 700 ml of urine. urine specimen sent to the Lab.
after numerous attempts unable to receive blood draw for patient stats. called lab to come down.
bed side bladder scan done, shows 627 ml of urine, Dr vela made aware, advised to do straight catheter.
pt. sleeping but easy to arouse. pt. states she does not want to live anymore. awaiting . in no apparent distress. on cm. will continue to monitor.
report given to ZAINAB Sousa
patient c/o discomfort in suprapubic area, unable to urinate on own, jolley placed 700 cc jolley kept in place. urine sent to lab. urine concentrated and thick. airway patient. lung sounds clear equal bilaterally, 1:1 intact Amy SNA at bedside, upper abdomen soft nontender, lower abd and suprapubic region tender on palpation, moves all extremities, + pulse all4 extremities. patient states after jolley insertion pain is gone. plan of care discussed with patient patient understands.

## 2017-04-24 NOTE — PROGRESS NOTE ADULT - ASSESSMENT
A & P : This is  a 70YO  WF -  was brought to ed due to percocet overdosage. As per patient she moved form [place] about 3 weeks ago, living with her daughter, she been to mean   She took percocet  last night plus Ambien and Klonopin,  As per patient she took 12 tablets, as per family, there were 60 tablets of  percocet,  only 14 left in bottle.    A/P    >Tylenol overdosage  >Suicidal attempt  >h/o psych comorbidities   >Alcohol abuse  >abnormal LFT   >SURINDER - improving   >Hypokalemia     Plan    LFT trending up, normal bilirubin  repeat Tylenol level <7.5   cont. NAC as LFT are trending up  cont. 1:1  INR 1.44  abdominal u/s - liver and renal   hepatitis panel pending   psych medication as per psychiatry   improving renal function  Liver u/s - Increased liver echogenicity. Small kidneys without hydronephrosis.  PPI, potassium supplement   f/u LFT, INR, BMP

## 2017-04-25 LAB
ALBUMIN SERPL ELPH-MCNC: 2.7 G/DL — LOW (ref 3.3–5.2)
ALP SERPL-CCNC: 66 U/L — SIGNIFICANT CHANGE UP (ref 40–120)
ALT FLD-CCNC: 554 U/L — HIGH
ANA TITR SER: NEGATIVE — SIGNIFICANT CHANGE UP
ANION GAP SERPL CALC-SCNC: 12 MMOL/L — SIGNIFICANT CHANGE UP (ref 5–17)
AST SERPL-CCNC: 578 U/L — HIGH
BILIRUB DIRECT SERPL-MCNC: 0.4 MG/DL — HIGH (ref 0–0.3)
BILIRUB INDIRECT FLD-MCNC: 0.6 MG/DL — SIGNIFICANT CHANGE UP (ref 0.2–1)
BILIRUB SERPL-MCNC: 1 MG/DL — SIGNIFICANT CHANGE UP (ref 0.4–2)
BUN SERPL-MCNC: 12 MG/DL — SIGNIFICANT CHANGE UP (ref 8–20)
CALCIUM SERPL-MCNC: 8.9 MG/DL — SIGNIFICANT CHANGE UP (ref 8.6–10.2)
CERULOPLASMIN SERPL-MCNC: 19 MG/DL — LOW (ref 20–60)
CHLORIDE SERPL-SCNC: 105 MMOL/L — SIGNIFICANT CHANGE UP (ref 98–107)
CO2 SERPL-SCNC: 21 MMOL/L — LOW (ref 22–29)
CREAT SERPL-MCNC: 0.78 MG/DL — SIGNIFICANT CHANGE UP (ref 0.5–1.3)
GLUCOSE SERPL-MCNC: 113 MG/DL — SIGNIFICANT CHANGE UP (ref 70–115)
HAV IGG+IGM SER QL: SIGNIFICANT CHANGE UP
HAV IGM SER-ACNC: SIGNIFICANT CHANGE UP
HBV CORE AB SER-ACNC: SIGNIFICANT CHANGE UP
HBV CORE IGM SER-ACNC: SIGNIFICANT CHANGE UP
HBV SURFACE AB SER-ACNC: SIGNIFICANT CHANGE UP
HBV SURFACE AG SER-ACNC: SIGNIFICANT CHANGE UP
HCT VFR BLD CALC: 33.9 % — LOW (ref 37–47)
HCV AB S/CO SERPL IA: 0.13 S/CO — SIGNIFICANT CHANGE UP
HCV AB SERPL-IMP: SIGNIFICANT CHANGE UP
HGB BLD-MCNC: 11 G/DL — LOW (ref 12–16)
INR BLD: 1.5 RATIO — HIGH (ref 0.88–1.16)
LKM AB SER-ACNC: 0.6 UNITS — SIGNIFICANT CHANGE UP (ref 0–20)
MAGNESIUM SERPL-MCNC: 1.7 MG/DL — LOW (ref 1.8–2.5)
MCHC RBC-ENTMCNC: 27.8 PG — SIGNIFICANT CHANGE UP (ref 27–31)
MCHC RBC-ENTMCNC: 32.4 G/DL — SIGNIFICANT CHANGE UP (ref 32–36)
MCV RBC AUTO: 85.6 FL — SIGNIFICANT CHANGE UP (ref 81–99)
PLATELET # BLD AUTO: 232 K/UL — SIGNIFICANT CHANGE UP (ref 150–400)
POTASSIUM SERPL-MCNC: 3.2 MMOL/L — LOW (ref 3.5–5.3)
POTASSIUM SERPL-SCNC: 3.2 MMOL/L — LOW (ref 3.5–5.3)
PROT SERPL-MCNC: 5.3 G/DL — LOW (ref 6.6–8.7)
PROTHROM AB SERPL-ACNC: 16.6 SEC — HIGH (ref 9.8–12.7)
RBC # BLD: 3.96 M/UL — LOW (ref 4.4–5.2)
RBC # FLD: 16.8 % — HIGH (ref 11–15.6)
SODIUM SERPL-SCNC: 138 MMOL/L — SIGNIFICANT CHANGE UP (ref 135–145)
WBC # BLD: 6.9 K/UL — SIGNIFICANT CHANGE UP (ref 4.8–10.8)
WBC # FLD AUTO: 6.9 K/UL — SIGNIFICANT CHANGE UP (ref 4.8–10.8)

## 2017-04-25 PROCEDURE — 99233 SBSQ HOSP IP/OBS HIGH 50: CPT

## 2017-04-25 PROCEDURE — 99232 SBSQ HOSP IP/OBS MODERATE 35: CPT

## 2017-04-25 RX ORDER — PHYTONADIONE (VIT K1) 5 MG
5 TABLET ORAL DAILY
Qty: 0 | Refills: 0 | Status: COMPLETED | OUTPATIENT
Start: 2017-04-25 | End: 2017-04-27

## 2017-04-25 RX ORDER — MAGNESIUM SULFATE 500 MG/ML
1 VIAL (ML) INJECTION ONCE
Qty: 0 | Refills: 0 | Status: COMPLETED | OUTPATIENT
Start: 2017-04-25 | End: 2017-04-25

## 2017-04-25 RX ORDER — POTASSIUM CHLORIDE 20 MEQ
40 PACKET (EA) ORAL ONCE
Qty: 0 | Refills: 0 | Status: COMPLETED | OUTPATIENT
Start: 2017-04-25 | End: 2017-04-25

## 2017-04-25 RX ORDER — LIDOCAINE 4 G/100G
1 CREAM TOPICAL ONCE
Qty: 0 | Refills: 0 | Status: COMPLETED | OUTPATIENT
Start: 2017-04-25 | End: 2017-04-25

## 2017-04-25 RX ORDER — ACETYLCYSTEINE 200 MG/ML
7 VIAL (ML) MISCELLANEOUS ONCE
Qty: 0 | Refills: 0 | Status: COMPLETED | OUTPATIENT
Start: 2017-04-25 | End: 2017-04-25

## 2017-04-25 RX ORDER — KETOROLAC TROMETHAMINE 30 MG/ML
15 SYRINGE (ML) INJECTION ONCE
Qty: 0 | Refills: 0 | Status: DISCONTINUED | OUTPATIENT
Start: 2017-04-25 | End: 2017-04-25

## 2017-04-25 RX ORDER — MAGNESIUM SULFATE 500 MG/ML
1 VIAL (ML) INJECTION EVERY 8 HOURS
Qty: 0 | Refills: 0 | Status: DISCONTINUED | OUTPATIENT
Start: 2017-04-25 | End: 2017-04-27

## 2017-04-25 RX ORDER — SODIUM,POTASSIUM PHOSPHATES 278-250MG
1 POWDER IN PACKET (EA) ORAL
Qty: 0 | Refills: 0 | Status: DISCONTINUED | OUTPATIENT
Start: 2017-04-25 | End: 2017-04-27

## 2017-04-25 RX ORDER — DEXTROSE MONOHYDRATE, SODIUM CHLORIDE, AND POTASSIUM CHLORIDE 50; .745; 4.5 G/1000ML; G/1000ML; G/1000ML
1000 INJECTION, SOLUTION INTRAVENOUS
Qty: 0 | Refills: 0 | Status: DISCONTINUED | OUTPATIENT
Start: 2017-04-25 | End: 2017-04-25

## 2017-04-25 RX ADMIN — Medication 100 GRAM(S): at 21:00

## 2017-04-25 RX ADMIN — DEXTROSE MONOHYDRATE, SODIUM CHLORIDE, AND POTASSIUM CHLORIDE 75 MILLILITER(S): 50; .745; 4.5 INJECTION, SOLUTION INTRAVENOUS at 09:22

## 2017-04-25 RX ADMIN — LIDOCAINE 1 PATCH: 4 CREAM TOPICAL at 21:11

## 2017-04-25 RX ADMIN — Medication 0.5 MILLIGRAM(S): at 18:09

## 2017-04-25 RX ADMIN — Medication 1 TABLET(S): at 21:00

## 2017-04-25 RX ADMIN — Medication 1 TABLET(S): at 11:31

## 2017-04-25 RX ADMIN — Medication 5 MILLIGRAM(S): at 13:04

## 2017-04-25 RX ADMIN — Medication 50 GRAM(S): at 11:30

## 2017-04-25 RX ADMIN — Medication 1 TABLET(S): at 18:09

## 2017-04-25 RX ADMIN — Medication 15 MILLIGRAM(S): at 21:00

## 2017-04-25 RX ADMIN — Medication 64.69 GRAM(S): at 13:03

## 2017-04-25 RX ADMIN — Medication 15 MILLIGRAM(S): at 21:15

## 2017-04-25 RX ADMIN — Medication 40 MILLIEQUIVALENT(S): at 11:29

## 2017-04-25 NOTE — PROGRESS NOTE BEHAVIORAL HEALTH - RISK ASSESSMENT
High - pt. overdosed on Percocet, Klonopin and Ambien, continues to be depressed, anxious, and hopeless. Minimizing currently suicidality, however she is not reliable historian and continues to be high suicide risk.

## 2017-04-25 NOTE — PROGRESS NOTE ADULT - ASSESSMENT
This is 71Y W was brought to ed due to percocet overdosage. As per patient she moved form [place] about 3 weeks ago, living with her daughter, she been to mean to her that's why she took percoset last night plus Ambien and Klonipin. As per patient she took 12 tablets, as per family, there were 60 tablets of percoset, only 14 left in bottle. abdominal u/s showed  Increased liver echogenicity. Small kidneys without hydronephrosis. Pt received NAC as per protocol, LFT was trending up, rediscussed with posion control, suggest to cont.. NAC for another 16 hours and repeat LFT and INR.  Pt is seen by psychiatry, 1:1 for safety     A/P    >Tylenol overdosage  >Suicidal attempt  >h/o psych comorbidities   >Alcohol abuse  >abnormal LFT   >SURINDER - improving   >Hypokalemia and hypomagnesemia     Plan    appreciate psych and GI input, AST improving, ALT trending up, INR 1.50, rediscuss with poison control, suggest to cont NAC for another 16 hours and recheck LFT 2 hours before completion   repeat tylenol level <7.5   cont. 1:1  INR 1.5  hepatitis panel negative    psych medication as per psychiatry   renal function improved   Abd. u/s - Increased liver echogenicity. Small kidneys without hydronephrosis.  PPI, potassium and magnesium  supplement   f/u repeat LFT, INR, BMP  SCD for DVT PPX

## 2017-04-25 NOTE — PROGRESS NOTE ADULT - ASSESSMENT
1.SURINDER - Resolved,  2.Hypokalemia, Hypomagnesemia & Hypophosphatemia    S : Nutritional support,    Replete Electrolytes & Minerals,

## 2017-04-25 NOTE — PROGRESS NOTE BEHAVIORAL HEALTH - NSBHCONSULTFOLLOWDETAILS_PSY_A_CORE FT
Patient requires inpatient psychiatric hospitalization when medically cleared. Patient at this time will require 2PC as not in agreement with plan.

## 2017-04-26 LAB
ALBUMIN SERPL ELPH-MCNC: 2.6 G/DL — LOW (ref 3.3–5.2)
ALBUMIN SERPL ELPH-MCNC: 2.6 G/DL — LOW (ref 3.3–5.2)
ALBUMIN SERPL ELPH-MCNC: 3 G/DL — LOW (ref 3.3–5.2)
ALP SERPL-CCNC: 66 U/L — SIGNIFICANT CHANGE UP (ref 40–120)
ALP SERPL-CCNC: 66 U/L — SIGNIFICANT CHANGE UP (ref 40–120)
ALP SERPL-CCNC: 75 U/L — SIGNIFICANT CHANGE UP (ref 40–120)
ALT FLD-CCNC: 412 U/L — HIGH
ALT FLD-CCNC: 418 U/L — HIGH
ALT FLD-CCNC: 418 U/L — HIGH
ANION GAP SERPL CALC-SCNC: 12 MMOL/L — SIGNIFICANT CHANGE UP (ref 5–17)
APAP SERPL-MCNC: <7.5 UG/ML — LOW (ref 10–26)
AST SERPL-CCNC: 225 U/L — HIGH
AST SERPL-CCNC: 253 U/L — HIGH
AST SERPL-CCNC: 253 U/L — HIGH
BASOPHILS # BLD AUTO: 0 K/UL — SIGNIFICANT CHANGE UP (ref 0–0.2)
BASOPHILS NFR BLD AUTO: 0.2 % — SIGNIFICANT CHANGE UP (ref 0–2)
BILIRUB DIRECT SERPL-MCNC: 0.3 MG/DL — SIGNIFICANT CHANGE UP (ref 0–0.3)
BILIRUB DIRECT SERPL-MCNC: 0.3 MG/DL — SIGNIFICANT CHANGE UP (ref 0–0.3)
BILIRUB INDIRECT FLD-MCNC: 0.6 MG/DL — SIGNIFICANT CHANGE UP (ref 0.2–1)
BILIRUB INDIRECT FLD-MCNC: 0.6 MG/DL — SIGNIFICANT CHANGE UP (ref 0.2–1)
BILIRUB SERPL-MCNC: 0.9 MG/DL — SIGNIFICANT CHANGE UP (ref 0.4–2)
BUN SERPL-MCNC: 11 MG/DL — SIGNIFICANT CHANGE UP (ref 8–20)
CALCIUM SERPL-MCNC: 8.7 MG/DL — SIGNIFICANT CHANGE UP (ref 8.6–10.2)
CHLORIDE SERPL-SCNC: 104 MMOL/L — SIGNIFICANT CHANGE UP (ref 98–107)
CO2 SERPL-SCNC: 20 MMOL/L — LOW (ref 22–29)
CREAT SERPL-MCNC: 0.76 MG/DL — SIGNIFICANT CHANGE UP (ref 0.5–1.3)
EOSINOPHIL # BLD AUTO: 0.6 K/UL — HIGH (ref 0–0.5)
EOSINOPHIL NFR BLD AUTO: 10.6 % — HIGH (ref 0–6)
GLUCOSE SERPL-MCNC: 108 MG/DL — SIGNIFICANT CHANGE UP (ref 70–115)
HCT VFR BLD CALC: 36 % — LOW (ref 37–47)
HGB BLD-MCNC: 11.6 G/DL — LOW (ref 12–16)
INR BLD: 1.1 RATIO — SIGNIFICANT CHANGE UP (ref 0.88–1.16)
LYMPHOCYTES # BLD AUTO: 1.3 K/UL — SIGNIFICANT CHANGE UP (ref 1–4.8)
LYMPHOCYTES # BLD AUTO: 23.2 % — SIGNIFICANT CHANGE UP (ref 20–55)
MAGNESIUM SERPL-MCNC: 2.3 MG/DL — SIGNIFICANT CHANGE UP (ref 1.8–2.5)
MAGNESIUM SERPL-MCNC: 2.5 MG/DL — SIGNIFICANT CHANGE UP (ref 1.8–2.5)
MCHC RBC-ENTMCNC: 27.7 PG — SIGNIFICANT CHANGE UP (ref 27–31)
MCHC RBC-ENTMCNC: 32.2 G/DL — SIGNIFICANT CHANGE UP (ref 32–36)
MCV RBC AUTO: 85.9 FL — SIGNIFICANT CHANGE UP (ref 81–99)
MITOCHONDRIA AB SER-ACNC: SIGNIFICANT CHANGE UP
MONOCYTES # BLD AUTO: 0.5 K/UL — SIGNIFICANT CHANGE UP (ref 0–0.8)
MONOCYTES NFR BLD AUTO: 8.6 % — SIGNIFICANT CHANGE UP (ref 3–10)
NEUTROPHILS # BLD AUTO: 3.1 K/UL — SIGNIFICANT CHANGE UP (ref 1.8–8)
NEUTROPHILS NFR BLD AUTO: 57.2 % — SIGNIFICANT CHANGE UP (ref 37–73)
PLATELET # BLD AUTO: 216 K/UL — SIGNIFICANT CHANGE UP (ref 150–400)
POTASSIUM SERPL-MCNC: 3.4 MMOL/L — LOW (ref 3.5–5.3)
POTASSIUM SERPL-SCNC: 3.4 MMOL/L — LOW (ref 3.5–5.3)
PROT SERPL-MCNC: 5.4 G/DL — LOW (ref 6.6–8.7)
PROT SERPL-MCNC: 5.4 G/DL — LOW (ref 6.6–8.7)
PROT SERPL-MCNC: 6 G/DL — LOW (ref 6.6–8.7)
PROTHROM AB SERPL-ACNC: 12.1 SEC — SIGNIFICANT CHANGE UP (ref 9.8–12.7)
RBC # BLD: 4.19 M/UL — LOW (ref 4.4–5.2)
RBC # FLD: 17.4 % — HIGH (ref 11–15.6)
SMOOTH MUSCLE AB SER-ACNC: ABNORMAL
SODIUM SERPL-SCNC: 136 MMOL/L — SIGNIFICANT CHANGE UP (ref 135–145)
WBC # BLD: 5.5 K/UL — SIGNIFICANT CHANGE UP (ref 4.8–10.8)
WBC # FLD AUTO: 5.5 K/UL — SIGNIFICANT CHANGE UP (ref 4.8–10.8)

## 2017-04-26 PROCEDURE — 99233 SBSQ HOSP IP/OBS HIGH 50: CPT

## 2017-04-26 PROCEDURE — 99232 SBSQ HOSP IP/OBS MODERATE 35: CPT

## 2017-04-26 RX ORDER — POLYETHYLENE GLYCOL 3350 17 G/17G
17 POWDER, FOR SOLUTION ORAL DAILY
Qty: 0 | Refills: 0 | Status: DISCONTINUED | OUTPATIENT
Start: 2017-04-26 | End: 2017-04-27

## 2017-04-26 RX ORDER — POTASSIUM CHLORIDE 20 MEQ
40 PACKET (EA) ORAL EVERY 4 HOURS
Qty: 0 | Refills: 0 | Status: COMPLETED | OUTPATIENT
Start: 2017-04-26 | End: 2017-04-26

## 2017-04-26 RX ORDER — DOCUSATE SODIUM 100 MG
100 CAPSULE ORAL
Qty: 0 | Refills: 0 | Status: DISCONTINUED | OUTPATIENT
Start: 2017-04-26 | End: 2017-04-27

## 2017-04-26 RX ADMIN — Medication 0.5 MILLIGRAM(S): at 06:15

## 2017-04-26 RX ADMIN — Medication 1 TABLET(S): at 18:04

## 2017-04-26 RX ADMIN — Medication 100 GRAM(S): at 14:40

## 2017-04-26 RX ADMIN — Medication 100 GRAM(S): at 06:15

## 2017-04-26 RX ADMIN — PANTOPRAZOLE SODIUM 40 MILLIGRAM(S): 20 TABLET, DELAYED RELEASE ORAL at 06:15

## 2017-04-26 RX ADMIN — Medication 5 MILLIGRAM(S): at 14:40

## 2017-04-26 RX ADMIN — Medication 1 TABLET(S): at 11:33

## 2017-04-26 RX ADMIN — LIDOCAINE 1 PATCH: 4 CREAM TOPICAL at 08:17

## 2017-04-26 RX ADMIN — Medication 100 MILLIGRAM(S): at 18:04

## 2017-04-26 RX ADMIN — Medication 0.5 MILLIGRAM(S): at 18:04

## 2017-04-26 RX ADMIN — Medication 40 MILLIEQUIVALENT(S): at 18:05

## 2017-04-26 RX ADMIN — Medication 40 MILLIEQUIVALENT(S): at 14:40

## 2017-04-26 RX ADMIN — Medication 120 MILLIGRAM(S): at 06:15

## 2017-04-26 RX ADMIN — Medication 100 GRAM(S): at 22:42

## 2017-04-26 RX ADMIN — Medication 1 TABLET(S): at 08:14

## 2017-04-26 NOTE — PROGRESS NOTE ADULT - ASSESSMENT
This is 71Y W was brought to ed due to percocet overdosage. As per patient she moved form [place] about 3 weeks ago, living with her daughter, she been to mean to her that's why she took percoset last night plus Ambien and Klonipin. As per patient she took 12 tablets, as per family, there were 60 tablets of percoset, only 14 left in bottle. abdominal u/s showed  Increased liver echogenicity. Small kidneys without hydronephrosis. Pt received NAC as per protocol, LFT was trending up, rediscussed with posion control, suggest to cont.. NAC for another 16 hours and repeat LFT and INR.  Pt is seen by psychiatry, 1:1 for safety     A/P    >Tylenol overdosage  >Suicidal attempt  >h/o psych comorbidities   >Alcohol abuse  >abnormal LFT - improving   >SURINDER - improved   >Hypokalemia     Plan    appreciate psych and GI input, AST//253, improving, completed another 16 hours of mucomyst, talked to poison control, updated about current LFT, awaiting maria t from toxicology fellow about further management   repeat tylenol level <7.5   cont. 1:1  INR 1.10, received vitamin K   hepatitis panel negative    psych medication as per psychiatry   renal function improved   Abd. u/s - Increased liver echogenicity. Small kidneys without hydronephrosis.  PPI, potassium and magnesium  supplement   f/u repeat LFT, INR, BMP  SCD for DVT PPX This is 71Y W was brought to ed due to percocet overdosage. As per patient she moved form [place] about 3 weeks ago, living with her daughter, she been to mean to her that's why she took percoset last night plus Ambien and Klonipin. As per patient she took 12 tablets, as per family, there were 60 tablets of percoset, only 14 left in bottle. abdominal u/s showed  Increased liver echogenicity. Small kidneys without hydronephrosis. Pt received NAC as per protocol, LFT was trending up, rediscussed with posion control, suggest to cont.. NAC for another 16 hours and repeat LFT and INR.  Pt is seen by psychiatry, 1:1 for safety     A/P    >Tylenol overdosage  >Suicidal attempt  >h/o psych comorbidities   >Alcohol abuse  >abnormal LFT - improving   >SURINDER - improved   >Hypokalemia     Plan    appreciate psych and GI input, AST//253, improving, completed another 16 hours of mucomyst, talked to poison control, updated about current LFT, suggest to recheck LFT now, tylenol level, if stable and improving, just monitor, if trending up- may need NAC  repeat tylenol level <7.5   cont. 1:1  INR 1.10, received vitamin K yesterday   hepatitis panel negative    psych medication as per psychiatry   renal function improved   Abd. u/s - Increased liver echogenicity. Small kidneys without hydronephrosis.  PPI, potassium and magnesium  supplement   f/u repeat LFT, INR, BMP  SCD for DVT PPX   once stable, will need inpatient psych admission

## 2017-04-26 NOTE — PROGRESS NOTE BEHAVIORAL HEALTH - PRIMARY DX
Bipolar disorder, curr episode mixed, severe, w/o psychotic features
Bipolar disorder, curr episode mixed, severe, w/o psychotic features

## 2017-04-26 NOTE — PROGRESS NOTE BEHAVIORAL HEALTH - NSBHFUPINTERVALHXFT_PSY_A_CORE
Chart was reviewed.  Patient continues to be on 1:1.  Patient was more forthcoming today.  Patient admits that she feels trapped.  She regrets coming to [place] but has sold her house and furniture so there is no way to go back. She relates that daughter has a violent temper and she finds it difficult to live with her.  She got teary eyed when reporting that daughter said that she would no longer yell at her.  She was also tearful when talking about her dog whom she was concerned about.  She admitted that she was feeling that she had nothing to live for.  She reported feeling less anxious. She admitted that her stressors have not changed and is concerned about what would happen if daughter and her continued to have hostile interactions.  She was able to engage in some problem solving today.  She reported she wanted to re engage with friends and start driving again. She is denying current S/H I/I/P.  Her mood continues to be labile. She does feel less anxious today and has started taking Ativan 0.5 mg twice daily.  She reported that Ambien no longer works for her sleep.  She does not feel that her ETOH use was a problem, and feels that daughter exagerates the extent that it was.  Patient admitted that in the past she had an episode where she made a salad while sleeping and taking Ambien and remember nothing about it in the morning.  She denies any knowledge of parasomnias.
Patient initially did not want to talk.  She was irritable and did not spontaneously elaborate on questions.  She did admit that she has been severely depressed for several weeks.  She reports that things started going down hill after she was pulled over for a DWI last September on her way to  pizza.  She reports stressors include move from [place], and constant verbal argument with her daughter whom she has been living with. She admits to depressed mood, decreased appetite, decreased energy, poor concentration. Patient reports she is unable to see the future. She reports hopelessness.  She admits that she tried to end her life by overdosing.  She states she feels fine about being alive.  She states she has been dealing with "terrible anxiety" that she describes as "these feelings inside of me and I shake". She denies any suicidality currently but is not able to state anything that has changes.  She reports she had 2-3 drinks of ETOH daily but does not feel that ETOH causes any problems.       Daughter at bedside, reported that she found pt unresponsive and cold to the touch in the morning. Patient did not seek help or give warning about what she was going to do.  She explains pt has been dealing with multiple stressors over the last 3 years.  More recently she had been falling secondary to ETOH use.  Daughter believes pt does not take medication properly.  States she has been sleep walking and eating with ambien. She feels pt is a high risk to herself.       When discussing psychiatric hospitalization, pt states "There is no way I am going there...I am going home".

## 2017-04-26 NOTE — PROGRESS NOTE BEHAVIORAL HEALTH - SUMMARY
Pt. is a 72 yo female BIBA after being found lethargic and somnolent s/p ingestion of #50 Percocet 5/325mg at 2200 on day prior to presentation as well as Ambien and Klonopin in a reported suicide gesture.  Pt. had moved from [place] 3 weeks ago, currently living with her daughter who she has been fighting with.  Pt. reports hx of anxiety and insomnia and was prescribed Klonopin and Ambien by a psychiatrist in [place].  Pt. somewhat confused about timeline of events but reports she had lived in [place] and got a DWI after she had several glasses of wine, drove to get pizza and  came back to find the police waiting for her.  She lost her license for a year and when she did get it back had to pass a breathalizer in her car  before she could drive.  She is tearful about this event, stating it started all her trouble, cost her lots of money in  fees and she has not recovered from the incident.  She was forced to come to [place] to live with her daughter.  She states her daughter watches her every move and criticizes her all the time so she felt she had no reason to live any longer.  Pt. denies any previous psychiatric hospitalizations.  She denies hx of homicidal ideation/plan/intent or A/V/H. Patient continues to report significant depressive sx's, anxiety and denies any current S/H I/I/P, although tearful and angry on interview and reporting multiple stressors.
Pt. is a 72 yo female BIBA after being found lethargic and somnolent s/p ingestion of #50 Percocet 5/325mg at 2200 on day prior to presentation as well as Ambien and Klonopin in a reported suicide gesture.  Pt. had moved from [place] 3 weeks ago, currently living with her daughter who she has been fighting with.  Pt. reports hx of anxiety and insomnia and was prescribed Klonopin and Ambien by a psychiatrist in [place].  Pt. somewhat confused about timeline of events but reports she had lived in [place] and got a DWI after she had several glasses of wine, drove to get pizza and  came back to find the police waiting for her.  She lost her license for a year and when she did get it back had to pass a breathalizer in her car  before she could drive.  She is tearful about this event, stating it started all her trouble, cost her lots of money in  fees and she has not recovered from the incident.  She was forced to come to [place] to live with her daughter.  She states her daughter watches her every move and criticizes her all the time so she felt she had no reason to live any longer.  Pt. denies any previous psychiatric hospitalizations.  She denies hx of homicidal ideation/plan/intent or A/V/H. Patient continues to report significant depressive sx's, anxiety and denies any current S/H I/I/P, although tearful and angry on interview and reporting multiple stressors.

## 2017-04-26 NOTE — PROGRESS NOTE BEHAVIORAL HEALTH - RISK ASSESSMENT
High - pt. overdosed on Percocet, Klonopin and Ambien, continues to be depressed, anxious, and hopeless. Minimizing currently suicidality, however she is not reliable historian and continues to be high suicide risk. High - pt. overdosed on Percocet, Klonopin and Ambien, continues to be depressed, anxious, and hopeless. Minimizing currently suicidality, although today more forthcoming about stressors and incident, given severity of suicide attempt (high risk/low rescue), depressive sx's, and no change in stressors and conditions, pt requires inpatient psychiatric hospitalization for safety and stabilization.

## 2017-04-26 NOTE — PROGRESS NOTE BEHAVIORAL HEALTH - NSBHCONSULTMEDS_PSY_A_CORE FT
Would start Ativan 0.5 mg 3 x daily for anxiety.   Discussed with pt starting Remeron 7.5 mg at night for insomnia/depression when liver function tests return to baseline
Would start Ativan 0.5 mg 3 x daily for anxiety.   Discussed with pt starting Remeron 7.5 mg at night for insomnia/depression when liver function tests return to baseline

## 2017-04-26 NOTE — PROGRESS NOTE BEHAVIORAL HEALTH - NSBHCONSULTFOLLOWDETAILS_PSY_A_CORE FT
Patient requires inpatient psychiatric hospitalization when medically cleared. Patient at this time will require 2PC as not in agreement with plan. Patient requires inpatient psychiatric hospitalization when medically cleared. Patient at this time will require 2PC as not in agreement with plan .

## 2017-04-27 VITALS — TEMPERATURE: 99 F

## 2017-04-27 LAB
ALBUMIN SERPL ELPH-MCNC: 2.8 G/DL — LOW (ref 3.3–5.2)
ALP SERPL-CCNC: 74 U/L — SIGNIFICANT CHANGE UP (ref 40–120)
ALT FLD-CCNC: 273 U/L — HIGH
ANION GAP SERPL CALC-SCNC: 12 MMOL/L — SIGNIFICANT CHANGE UP (ref 5–17)
AST SERPL-CCNC: 105 U/L — HIGH
BILIRUB SERPL-MCNC: 0.7 MG/DL — SIGNIFICANT CHANGE UP (ref 0.4–2)
BUN SERPL-MCNC: 12 MG/DL — SIGNIFICANT CHANGE UP (ref 8–20)
CALCIUM SERPL-MCNC: 8.7 MG/DL — SIGNIFICANT CHANGE UP (ref 8.6–10.2)
CHLORIDE SERPL-SCNC: 109 MMOL/L — HIGH (ref 98–107)
CO2 SERPL-SCNC: 21 MMOL/L — LOW (ref 22–29)
CREAT SERPL-MCNC: 0.68 MG/DL — SIGNIFICANT CHANGE UP (ref 0.5–1.3)
GLUCOSE SERPL-MCNC: 99 MG/DL — SIGNIFICANT CHANGE UP (ref 70–115)
POTASSIUM SERPL-MCNC: 4.6 MMOL/L — SIGNIFICANT CHANGE UP (ref 3.5–5.3)
POTASSIUM SERPL-SCNC: 4.6 MMOL/L — SIGNIFICANT CHANGE UP (ref 3.5–5.3)
PROT SERPL-MCNC: 5.3 G/DL — LOW (ref 6.6–8.7)
SODIUM SERPL-SCNC: 142 MMOL/L — SIGNIFICANT CHANGE UP (ref 135–145)

## 2017-04-27 PROCEDURE — 86376 MICROSOMAL ANTIBODY EACH: CPT

## 2017-04-27 PROCEDURE — 84300 ASSAY OF URINE SODIUM: CPT

## 2017-04-27 PROCEDURE — 99233 SBSQ HOSP IP/OBS HIGH 50: CPT

## 2017-04-27 PROCEDURE — 80048 BASIC METABOLIC PNL TOTAL CA: CPT

## 2017-04-27 PROCEDURE — 36415 COLL VENOUS BLD VENIPUNCTURE: CPT

## 2017-04-27 PROCEDURE — 87340 HEPATITIS B SURFACE AG IA: CPT

## 2017-04-27 PROCEDURE — 81001 URINALYSIS AUTO W/SCOPE: CPT

## 2017-04-27 PROCEDURE — 84466 ASSAY OF TRANSFERRIN: CPT

## 2017-04-27 PROCEDURE — 82728 ASSAY OF FERRITIN: CPT

## 2017-04-27 PROCEDURE — 99285 EMERGENCY DEPT VISIT HI MDM: CPT | Mod: 25

## 2017-04-27 PROCEDURE — 86709 HEPATITIS A IGM ANTIBODY: CPT

## 2017-04-27 PROCEDURE — 86038 ANTINUCLEAR ANTIBODIES: CPT

## 2017-04-27 PROCEDURE — 76700 US EXAM ABDOM COMPLETE: CPT

## 2017-04-27 PROCEDURE — 83605 ASSAY OF LACTIC ACID: CPT

## 2017-04-27 PROCEDURE — 85610 PROTHROMBIN TIME: CPT

## 2017-04-27 PROCEDURE — 80053 COMPREHEN METABOLIC PANEL: CPT

## 2017-04-27 PROCEDURE — 83550 IRON BINDING TEST: CPT

## 2017-04-27 PROCEDURE — 85027 COMPLETE CBC AUTOMATED: CPT

## 2017-04-27 PROCEDURE — 86255 FLUORESCENT ANTIBODY SCREEN: CPT

## 2017-04-27 PROCEDURE — 86704 HEP B CORE ANTIBODY TOTAL: CPT

## 2017-04-27 PROCEDURE — 96376 TX/PRO/DX INJ SAME DRUG ADON: CPT | Mod: XU

## 2017-04-27 PROCEDURE — 96374 THER/PROPH/DIAG INJ IV PUSH: CPT | Mod: XU

## 2017-04-27 PROCEDURE — 93005 ELECTROCARDIOGRAM TRACING: CPT

## 2017-04-27 PROCEDURE — 80069 RENAL FUNCTION PANEL: CPT

## 2017-04-27 PROCEDURE — 84100 ASSAY OF PHOSPHORUS: CPT

## 2017-04-27 PROCEDURE — 86706 HEP B SURFACE ANTIBODY: CPT

## 2017-04-27 PROCEDURE — 83735 ASSAY OF MAGNESIUM: CPT

## 2017-04-27 PROCEDURE — 82390 ASSAY OF CERULOPLASMIN: CPT

## 2017-04-27 PROCEDURE — 83935 ASSAY OF URINE OSMOLALITY: CPT

## 2017-04-27 PROCEDURE — 84133 ASSAY OF URINE POTASSIUM: CPT

## 2017-04-27 PROCEDURE — 86803 HEPATITIS C AB TEST: CPT

## 2017-04-27 PROCEDURE — 86705 HEP B CORE ANTIBODY IGM: CPT

## 2017-04-27 PROCEDURE — 99239 HOSP IP/OBS DSCHRG MGMT >30: CPT

## 2017-04-27 PROCEDURE — 80076 HEPATIC FUNCTION PANEL: CPT

## 2017-04-27 PROCEDURE — 86381 MITOCHONDRIAL ANTIBODY EACH: CPT

## 2017-04-27 PROCEDURE — 86708 HEPATITIS A ANTIBODY: CPT

## 2017-04-27 PROCEDURE — 51701 INSERT BLADDER CATHETER: CPT

## 2017-04-27 PROCEDURE — 85730 THROMBOPLASTIN TIME PARTIAL: CPT

## 2017-04-27 PROCEDURE — 80074 ACUTE HEPATITIS PANEL: CPT

## 2017-04-27 PROCEDURE — 80307 DRUG TEST PRSMV CHEM ANLYZR: CPT

## 2017-04-27 RX ORDER — LACTULOSE 10 G/15ML
20 SOLUTION ORAL ONCE
Qty: 0 | Refills: 0 | Status: COMPLETED | OUTPATIENT
Start: 2017-04-27 | End: 2017-04-27

## 2017-04-27 RX ORDER — POLYETHYLENE GLYCOL 3350 17 G/17G
17 POWDER, FOR SOLUTION ORAL
Qty: 0 | Refills: 0 | COMMUNITY
Start: 2017-04-27

## 2017-04-27 RX ORDER — POTASSIUM CHLORIDE 20 MEQ
0 PACKET (EA) ORAL
Qty: 0 | Refills: 0 | COMMUNITY

## 2017-04-27 RX ORDER — QUETIAPINE FUMARATE 200 MG/1
1 TABLET, FILM COATED ORAL
Qty: 0 | Refills: 0 | COMMUNITY

## 2017-04-27 RX ORDER — DOCUSATE SODIUM 100 MG
1 CAPSULE ORAL
Qty: 0 | Refills: 0 | COMMUNITY
Start: 2017-04-27

## 2017-04-27 RX ORDER — CLONAZEPAM 1 MG
1 TABLET ORAL
Qty: 0 | Refills: 0 | COMMUNITY

## 2017-04-27 RX ADMIN — POLYETHYLENE GLYCOL 3350 17 GRAM(S): 17 POWDER, FOR SOLUTION ORAL at 11:30

## 2017-04-27 RX ADMIN — LACTULOSE 20 GRAM(S): 10 SOLUTION ORAL at 11:30

## 2017-04-27 RX ADMIN — PANTOPRAZOLE SODIUM 40 MILLIGRAM(S): 20 TABLET, DELAYED RELEASE ORAL at 06:21

## 2017-04-27 RX ADMIN — Medication 1 TABLET(S): at 08:13

## 2017-04-27 RX ADMIN — Medication 1 TABLET(S): at 11:33

## 2017-04-27 RX ADMIN — Medication 120 MILLIGRAM(S): at 06:21

## 2017-04-27 RX ADMIN — Medication 5 MILLIGRAM(S): at 12:58

## 2017-04-27 RX ADMIN — Medication 0.5 MILLIGRAM(S): at 06:21

## 2017-04-27 RX ADMIN — Medication 100 MILLIGRAM(S): at 06:23

## 2017-04-27 NOTE — PROGRESS NOTE ADULT - PROBLEM SELECTOR PROBLEM 2
Abnormal LFTs (liver function tests)
Tylenol toxicity, intentional self-harm, initial encounter
LFTs abnormal
Tylenol toxicity, intentional self-harm, initial encounter
LFTs abnormal
LFTs abnormal

## 2017-04-27 NOTE — DISCHARGE NOTE ADULT - MEDICATION SUMMARY - MEDICATIONS TO TAKE
I will START or STAY ON the medications listed below when I get home from the hospital:    dilTIAZem 120 mg/24 hours oral capsule, extended release  -- 1 cap(s) by mouth once a day  -- Indication: For home medication    LORazepam 0.5 mg oral tablet  -- 1 tab(s) by mouth 2 times a day  -- Indication: For Anxiety disorder, unspecified type    ferrous sulfate 324 mg (65 mg elemental iron) oral delayed release tablet  --  by mouth   -- Indication: For home medication    docusate sodium 100 mg oral capsule  -- 1 cap(s) by mouth 2 times a day  -- Indication: For Constipation    polyethylene glycol 3350 oral powder for reconstitution  -- 17 gram(s) by mouth once a day, As Needed  -- Indication: For Constipation    esomeprazole 40 mg oral delayed release capsule  -- 1 cap(s) by mouth once a day  -- Indication: For home medication

## 2017-04-27 NOTE — DISCHARGE NOTE ADULT - HOSPITAL COURSE
This is 71Y W was brought to ed due to percocet overdosage. History is obtained from patient's daughter. As per patient she moved form [place] about 3 weeks ago, living with her daughter, she been to mean to her that's why she took percoset last night plus Ambien and Klonipin. As per patient she took 12 tablets, as per family, there were 60 tablets of percoset, only 14 left in bottle. As per daughter she has h/o bipolar disorder, also she has h/o Suicidal attempt in past. At the time exam, pt looks depressed, not making eye contact, denied chest pain, SOB, abd. pain, nausea, vomiting, urinary and bowel complaints. lLabs on admission showed  Tylenol 92.3, AST: 214  ALT: 116, lactate 2.3, BUN 23.0, Cr: 2.0. Pt was admitted for  Tylenol overdosage and Suicidal attempt, poison  control notified, treated  with NAC as per protocoal, initially noted to have worsening of LFT, as per poison  control received NAC for 16 hours x 2, later on LFT are noted to be improving. Also seen by GI. Abd. u/s - Increased liver echogenicity, hepatitis panel negative. Small kidneys without hydronephrosis. Pt was placed on 1: 1 for suicidal ideation, seen by Psychiatry, suggest inpatient psych admission. Pt is clear by GI to be d/c, suggest to f/u LFT in 1 week, will be d/c to inpatient psych admission       Repeat labs showed improved labs  tylenol level <7.5 , INR 1.10,  from 827,  from 554    Pt denied chest pain, SOB, abd. pain, nausea and vomiting. Pt is medically stable to be d.c to inpatient psych, f/u LFT in 1 week     ICU Vital Signs Last 24 Hrs  T(C): 36.6, Max: 36.9 (04-26 @ 16:00)  T(F): 97.9, Max: 98.5 (04-26 @ 16:00)  HR: 80 (60 - 80)  BP: 142/77 (119/62 - 142/77)  BP(mean): 84 (80 - 100)  ABP: --  ABP(mean): --  RR: 16 (12 - 20)  SpO2: 96% (95% - 98%)    PHYSICAL EXAM:    GENERAL: anxious   EYES: EOMI, PERRLA  CHEST/LUNG: positive air entry   HEART: s1/s2 audible   ABDOMEN: Soft, Nontender, Nondistended; Bowel sounds present  EXTREMITIES:  2+ Peripheral Pulses, No clubbing, cyanosis, or edema    time spent 35 minutes

## 2017-04-27 NOTE — DISCHARGE NOTE ADULT - PROVIDER TOKENS
TOKEN:'[id number]:'[id number]',FREE:[LAST:[PCP],PHONE:[(   )    -],FAX:[(   )    -]]

## 2017-04-27 NOTE — DISCHARGE NOTE ADULT - PATIENT PORTAL LINK FT
“You can access the Follow[facility] Patient Portal, offered by [facility], by registering with the following website: [email or web address]”

## 2017-04-27 NOTE — PROGRESS NOTE ADULT - PROBLEM SELECTOR PROBLEM 1
SURINDER (acute kidney injury)
SURINDER (acute kidney injury)
Abnormal LFTs (liver function tests)
Tylenol toxicity, intentional self-harm, initial encounter

## 2017-04-27 NOTE — DISCHARGE NOTE ADULT - CARE PROVIDER_API CALL
Eron Andino), Psychiatry  [address] [place] [address]  Phone: [phone]  Fax: [phone]    Luis Maldonado), Gastroenterology; Internal Medicine  [address] [place] [address]  Phone: [phone]  Fax: [phone]    PCP,   Phone: (   )    -  Fax: (   )    -

## 2017-04-27 NOTE — DISCHARGE NOTE ADULT - MEDICATION SUMMARY - MEDICATIONS TO STOP TAKING
I will STOP taking the medications listed below when I get home from the hospital:    Percocet 5/325 oral tablet  -- 1 tab(s) by mouth every 6 hours    clonazePAM 1 mg oral tablet  -- 1 tab(s) by mouth 3 times a day    triamterene-hydrochlorothiazide 37.5mg-25mg oral tablet  -- 1 tab(s) by mouth once a day    QUEtiapine 200 mg oral tablet  -- 1 tab(s) by mouth 2 times a day    pravastatin 40 mg oral tablet  -- 1 tab(s) by mouth once a day

## 2017-04-27 NOTE — DISCHARGE NOTE ADULT - PLAN OF CARE
. Inpatient psych admission, further management as per psychiatry improving f/u LFT in 1 week improved counseled management as per psychiatry

## 2017-04-27 NOTE — PROGRESS NOTE ADULT - PROBLEM SELECTOR PLAN 1
Resolved,
Likely secondary to recent APAP OD and active ETOH abuse. Liver chemistries are improving. Repeat liver chemistries and coags ordered. Vitamin K also ordered to see if increased PT/ INR secondary to nutritional deficiency of Vitamin K.
Mucomyst complete  Check LFT in am. PT normal  Psych note appreciated. pt will  go to inpatient facility when stable.
On therapy;  No worsening of clinical status.  Continue to observe and trend BW.
Overall improved.  LFT's at least stable.  Coags better.  No bleeding or encephalopathy.All hepatitis profiles and serologies are negative.  Sono abd is negative.  As long as LFT's are trending downward, pt is clearable from GI POV for discharge.  .  No special diet.
Improving ( pre - Renal )

## 2017-04-27 NOTE — PROGRESS NOTE ADULT - SUBJECTIVE AND OBJECTIVE BOX
Renal :          Acute kidney injury (acute renal failure) — The incidence of renal dysfunction is related to the severity of the acetaminophen </contents/acetaminophen-paracetamol-drug-information?source=see_link> ingestion. Renal impairment has been estimated to occur in less than 2 percent of all patients (including those with minimal disease), 5 percent of cases with liver involvement but no hepatic failure, 10 percent of severe poisonings, and as many as 53 percent of cases with acute hepatic failure.  It is possible in the last setting that a hepatorenal-type syndrome, as well as direct toxicity, contributes to the development of renal failure.  Acute kidney injury is manifested by elevations of blood urea nitrogen and creatinine along with proteinuria, hematuria, and granular and epithelial cell casts on urinalysis.   Acute kidney injury is due primarily to acute tubular necrosis.  Vascular endothelial damage also can occur, so that both direct toxicity and ischemia may contribute to the tubular injury,  Renal function spontaneously returns to the previous baseline within one to four weeks, although dialysis may be required during the acute episode.    There is no evidence that acetylcysteine </contents/acetylcysteine-drug-information?source=see_link>, which is given to minimize hepatotoxicity, has any protective effect on the kidney.
Renal :    138    |  105    |  12.0   ----------------------------<  113    Ca:8.9   (2017 08:30)  3.2<L>   |  21.0<L>  |  0.78       eGFR if Non : 77  eGFR if : 89    TPro  5.3 g/dL<L>  /  Alb  2.7 g/dL<L>  /  TBili  1.0 mg/dL  /  DBili  0.4 mg/dL<H>  /  AST  578 U/L<H>  /  ALT  554 U/L<H>  /  AlkPhos  66 U/L  2017 08:30                        11.0<L>  6.9   )-----------( 232      ( 2017 08:32 )             33.9<L>    Phos:-- M.7 mg/dL<L> PTH:-- Uric acid:-- Serum Osm:--  Ferritin:-- Iron:-- TIBC:-- Tsat:--  B12:-- TSH:-- ( @ 08:30)    Urinalysis Basic - ( 2017 08:20 )  Color: Yellow / Appearance: Clear / S.020 / pH: x  Gluc: x / Ketone: Small<!>  / Bili: Negative / Urobili: Negative mg/dL   Blood: x / Protein: 15 mg/dL<!> / Nitrite: Negative   Leuk Esterase: Moderate<!> / RBC: 3-5 /HPF<!> / WBC 26-50<!>   Sq Epi: x / Non Sq Epi: Moderate<!> / Bacteria: x      UProt:-- UCr:-- P/C Ratio:-- 24 hour Prot:-- UVol:-- CrCl:--  Suzan:36 mmol/L UOsm:427 mosm/kg UVol:-- UCl:-- UK:87 mmol/L ( @ 21:31)    SURINDER  Resolved ( Pre - Renal )    Rec: Nutritional Support,    Replete K+ , Mg++ & P+,    Will no longer routinely Follow,    Thank you,
Renal :    F.U Nephrotoxicity - Acetaminophen,    SURINDER, ( Pre - Renal )      Patient is a 71y old  Female who presents with a chief complaint of Tylenol overdosage, suicidal ideation (23 Apr 2017 13:54)    HPI:  This is 71Y W was brought to ed due to percocet overdosage. History is obtained from pt, daughter and ED provider. As per patient she moved form [place] about 3 weeks ago, living with her daughter, she been to mean to her that's why she took percocet last night plus Ambien and Klonopin ,  As per patient she took 12 tablets, as per family, there were 60 tablets of percocet,  only 14 left in bottle. As per daughter she has h/o bipolar disorder, also she has h/o Suicidal attempt in past. At the time exam, pt looks depressed, not making eye contact, denied chest pain, SOB, abd.  pain,   nausea, vomiting, urinary and bowel complaints.    labs showed Tylenol 92.3, AST: 214  ALT: 116, lactate 2.3, BUN 23.0, Cr: 2.0 (23 Apr 2017 13:54)    INTERVAL HPI/ OVERNIGHT EVENTS: Patient is seen and examined, pt reports   mild epigastric pain, denied nausea, vomiting, chest pain, SOB. Pt denied SI/HI     REVIEW OF SYSTEMS:    Denied fever, chills, nausea, vomiting, chest pain, SOB, headache, dizziness    PHYSICAL EXAM:    Vital Signs Last 24 Hrs  T(C): 37.2, Max: 37.2 (04-24 @ 10:54)  T(F): 98.9, Max: 98.9 (04-24 @ 10:54)  HR: 72 (72 - 90)  BP: 138/66 (126/68 - 173/80)  BP(mean): --  RR: 18 (18 - 20)  SpO2: 97% (96% - 99%)    GENERAL: NAD  HEENT: EOMI  Neck: supple  CHEST/LUNG: positive air entry   HEART: S1S2+ audible  ABDOMEN: sensitive epigastrium, soft, ND, bs+ve   EXTREMITIES:  no edema  CNS: A& O X 3  Psychiatry: Appears very depressed     LABS:                        15.0   9.8   )-----------( 285      ( 24 Apr 2017 05:56 )             46.1     04-24    141  |  102  |  21.0<H>  ----------------------------<  129<H>  3.4<L>   |  20.0<L>  |  1.65<H>    Ca    9.6      24 Apr 2017 05:56  Phos  2.8     04-24  Mg     2.0     04-24    TPro  6.9  /  Alb  3.7  /  TBili  1.0  /  DBili  x   /  AST  827<H>  /  ALT  485<H>  /  AlkPhos  88  04-24    PT/INR - ( 24 Apr 2017 05:56 )   PT: 16.0 sec;   INR: 1.44 ratio         PTT - ( 24 Apr 2017 05:56 )  PTT:32.8 sec  Urinalysis Basic - ( 24 Apr 2017 08:20 )    Acetaminophen Level, Serum (04.24.17 @ 10:27)    Acetaminophen Level, Serum: <7.5 ug/mL      MEDICATIONS  (STANDING):  sodium chloride 0.9%. 1000milliLiter(s) IV Continuous <Continuous>  diltiazem   CD 120milliGRAM(s) Oral daily  acetylcysteine IVPB 7Gram(s) IV Intermittent once  pantoprazole    Tablet 40milliGRAM(s) Oral before breakfast    MEDICATIONS  (PRN):  ondansetron Injectable 4milliGRAM(s) IV Push every 6 hours PRN Nausea and/or Vomiting  LORazepam   Injectable 1milliGRAM(s) IV Push every 1 hour PRN CIWA-Ar score 8 or greater      RADIOLOGY & ADDITIONAL TEST    EXAM:  US ABDOMEN COMPLETE                        PROCEDURE DATE:  04/23/2017    IMPRESSION:     Increased liver echogenicity. Small kidneys without hydronephrosis.    Assessment and Plan:   		  This is a  71Y WF - Who was brought to the ED,  due to percocet overdosage. As per patient she moved form [place] about 3 weeks ago, living with her daughter,     As per patient she took 12 tablets, as per family, there were 60 tablets of percocet, only 14 left in bottle.    A/P    >Tylenol overdosage  >Suicidal attempt  >h/o psych comorbidities   >Alcohol abuse  >abnormal LFT   >SURINDER - improving   >Hypokalemia     Plan    LFT trending up, normal bilirubin  repeat tylenol  level < 7.5   cont. NAC as LFT are trending up  cont. 1:1  INR 1.44  abdominal u/s - liver and renal   hepatitis panel pending   psych medication as per psychiatry   improving renal function  Liver u/s - Increased liver echogenicity.   Small kidneys without hydronephrosis.  PPI,   potassium supplement   f/u LFT, INR, BMP
Internal Medicine Hospitalist - Dr. Court ALVARADO    [id number]    71y      Female    Patient is a 71y old  Female who presents with a chief complaint of Tylenol overdosage, suicidal ideation (23 Apr 2017 13:54)    HPI:  This is 71Y W was brought to ed due to percocet overdosage. History is obtained from pt, daughter and ED provider. As per patient she moved form [place] about 3 weeks ago, living with her daughter, she been to mean to her that's why she took percoset last night plus Ambien and Klonipin. As per patient she took 12 tablets, as per family, there were 60 tablets of percoset, only 14 left in bottle. As per daughter she has h/o bipolar disorder, also she has h/o Suicidal attempt in past. At the time exam, pt looks depressed, not making eye contact, denied chest pain, SOB, abd. pain, nausea, vomiting, urinary and bowel complaints.    INTERVAL HPI/ OVERNIGHT EVENTS: Patient is seen and examined, denied abd. pain, nausea, vomiting, tolerating diet     REVIEW OF SYSTEMS:    Denied fever, chills, abd. pain, nausea, vomiting, chest pain, SOB, headache, dizziness    PHYSICAL EXAM:    Vital Signs Last 24 Hrs  T(C): 36.7, Max: 37.1 (04-26 @ 08:12)  T(F): 98, Max: 98.7 (04-26 @ 08:12)  HR: 55 (55 - 81)  BP: 113/53 (102/54 - 142/96)  BP(mean): 72 (67 - 72)  RR: 15 (15 - 22)  SpO2: 96% (96% - 100%)    GENERAL: NAD  HEENT: EOMI  Neck: supple  CHEST/LUNG: positive air entry   HEART: S1S2+ audible  ABDOMEN: Soft, Nontender, Nondistended; Bowel sounds present  EXTREMITIES:  no edema  CNS: AAO X 3  Psychiatry: anxious     LABS:                        11.6   5.5   )-----------( 216      ( 26 Apr 2017 07:59 )             36.0     04-26    136  |  104  |  11.0  ----------------------------<  108  3.4<L>   |  20.0<L>  |  0.76    Ca    8.7      26 Apr 2017 07:59  Phos  1.7     04-24  Mg     2.5     04-26    TPro  5.4<L>  /  Alb  2.6<L>  /  TBili  0.9  /  DBili  0.3  /  AST  253<H>  /  ALT  418<H>  /  AlkPhos  66  04-26    PT/INR - ( 26 Apr 2017 09:37 )   PT: 12.1 sec;   INR: 1.10 ratio                 MEDICATIONS  (STANDING):  diltiazem   CD 120milliGRAM(s) Oral daily  pantoprazole    Tablet 40milliGRAM(s) Oral before breakfast  potassium acid phosphate/sodium acid phosphate tablet (K-PHOS No. 2) 1Tablet(s) Oral four times a day with meals  magnesium sulfate  IVPB 1Gram(s) IV Intermittent every 8 hours  phytonadione   Solution 5milliGRAM(s) Oral daily  LORazepam     Tablet 0.5milliGRAM(s) Oral two times a day  potassium chloride    Tablet ER 40milliEquivalent(s) Oral every 4 hours    MEDICATIONS  (PRN):  ondansetron Injectable 4milliGRAM(s) IV Push every 6 hours PRN Nausea and/or Vomiting  LORazepam   Injectable 1milliGRAM(s) IV Push every 1 hour PRN CIWA-Ar score 8 or greater      RADIOLOGY & ADDITIONAL TEST
Internal Medicine Hospitalist - Dr. Court ALVARADO    [id number]    71y      Female    Patient is a 71y old  Female who presents with a chief complaint of Tylenol overdosage, suicidal ideation (23 Apr 2017 13:54)    HPI:  This is 71Y W was brought to ed due to percocet overdosage. History is obtained from pt, daughter and ED provider. As per patient she moved form [place] about 3 weeks ago, living with her daughter, she been to mean to her that's why she took percoset last night plus Ambien and Klonipin. As per patient she took 12 tablets, as per family, there were 60 tablets of percoset, only 14 left in bottle. As per daughter she has h/o bipolar disorder, also she has h/o Suicidal attempt in past. At the time exam, pt looks depressed, not making eye contact, denied chest pain, SOB, abd. pain, nausea, vomiting, urinary and bowel complaints.    labs showed Tylenol 92.3, AST: 214  ALT: 116, lactate 2.3, BUN 23.0, Cr: 2.0 (23 Apr 2017 13:54)    INTERVAL HPI/ OVERNIGHT EVENTS: Patient is seen and examined, pt report mild epigastric pain, denied nausea, vomiting, chest pain, SOB. Pt denied SI/HI     REVIEW OF SYSTEMS:    Denied fever, chills, nausea, vomiting, chest pain, SOB, headache, dizziness    PHYSICAL EXAM:    Vital Signs Last 24 Hrs  T(C): 37.2, Max: 37.2 (04-24 @ 10:54)  T(F): 98.9, Max: 98.9 (04-24 @ 10:54)  HR: 72 (72 - 90)  BP: 138/66 (126/68 - 173/80)  BP(mean): --  RR: 18 (18 - 20)  SpO2: 97% (96% - 99%)    GENERAL: NAD  HEENT: EOMI  Neck: supple  CHEST/LUNG: positive air entry   HEART: S1S2+ audible  ABDOMEN: sensitive epigastrium, soft, ND, bs+ve   EXTREMITIES:  no edema  CNS: AAO X 3  Psychiatry: looks depressed     LABS:                        15.0   9.8   )-----------( 285      ( 24 Apr 2017 05:56 )             46.1     04-24    141  |  102  |  21.0<H>  ----------------------------<  129<H>  3.4<L>   |  20.0<L>  |  1.65<H>    Ca    9.6      24 Apr 2017 05:56  Phos  2.8     04-24  Mg     2.0     04-24    TPro  6.9  /  Alb  3.7  /  TBili  1.0  /  DBili  x   /  AST  827<H>  /  ALT  485<H>  /  AlkPhos  88  04-24    PT/INR - ( 24 Apr 2017 05:56 )   PT: 16.0 sec;   INR: 1.44 ratio         PTT - ( 24 Apr 2017 05:56 )  PTT:32.8 sec  Urinalysis Basic - ( 24 Apr 2017 08:20 )    Acetaminophen Level, Serum (04.24.17 @ 10:27)    Acetaminophen Level, Serum: <7.5 ug/mL      MEDICATIONS  (STANDING):  sodium chloride 0.9%. 1000milliLiter(s) IV Continuous <Continuous>  diltiazem   CD 120milliGRAM(s) Oral daily  acetylcysteine IVPB 7Gram(s) IV Intermittent once  pantoprazole    Tablet 40milliGRAM(s) Oral before breakfast    MEDICATIONS  (PRN):  ondansetron Injectable 4milliGRAM(s) IV Push every 6 hours PRN Nausea and/or Vomiting  LORazepam   Injectable 1milliGRAM(s) IV Push every 1 hour PRN CIWA-Ar score 8 or greater      RADIOLOGY & ADDITIONAL TEST     EXAM:  US ABDOMEN COMPLETE                        PROCEDURE DATE:  04/23/2017    IMPRESSION:     Increased liver echogenicity. Small kidneys without hydronephrosis.
Internal Medicine Hospitalist - Dr. Court ALVARADO    [id number]    71y      Female    Patient is a 71y old  Female who presents with a chief complaint of Tylenol overdosage, suicidal ideation (2017 13:54)    HPI:  This is 71Y W was brought to ed due to percocet overdosage. History is obtained from pt, daughter and ED provider. As per patient she moved form [place] about 3 weeks ago, living with her daughter, she been to mean to her that's why she took percoset last night plus Ambien and Klonipin. As per patient she took 12 tablets, as per family, there were 60 tablets of percoset, only 14 left in bottle. As per daughter she has h/o bipolar disorder, also she has h/o Suicidal attempt in past. At the time exam, pt looks depressed, not making eye contact, denied chest pain, SOB, abd. pain, nausea, vomiting, urinary and bowel complaints.        INTERVAL HPI/ OVERNIGHT EVENTS: Patient is seen and examined, pt is anxious, denied abd. pain, nausea, vomiting, poor oral intake.    REVIEW OF SYSTEMS:    Denied fever, chills, abd. pain, nausea, vomiting, chest pain, SOB, headache, dizziness    PHYSICAL EXAM:    Vital Signs Last 24 Hrs  T(C): 36.7, Max: 37.3 ( @ 00:08)  T(F): 98, Max: 99.1 ( @ 00:08)  HR: 61 (61 - 84)  BP: 93/58 (93/58 - 146/82)  BP(mean): --  RR: 16 (16 - 20)  SpO2: 96% (96% - 100%)    GENERAL: anxious   HEENT: EOMI  Neck: supple  CHEST/LUNG: positive air entry   HEART: S1S2+ audible  ABDOMEN: Soft, Nontender, Nondistended; Bowel sounds present  EXTREMITIES:  no edema  CNS: Awake,   Psychiatry: anxious     LABS:                        11.0   6.9   )-----------( 232      ( 2017 08:32 )             33.9         138  |  105  |  12.0  ----------------------------<  113  3.2<L>   |  21.0<L>  |  0.78    Ca    8.9      2017 08:30  Phos  1.7     04-24  Mg     1.7     04-25    TPro  5.3<L>  /  Alb  2.7<L>  /  TBili  1.0  /  DBili  0.4<H>  /  AST  578<H>  /  ALT  554<H>  /  AlkPhos  66  04-25    PT/INR - ( 2017 08:29 )   PT: 16.6 sec;   INR: 1.50 ratio         PTT - ( 2017 05:56 )  PTT:32.8 sec  Urinalysis Basic - ( 2017 08:20 )    Color: Yellow / Appearance: Clear / S.020 / pH: x  Gluc: x / Ketone: Small  / Bili: Negative / Urobili: Negative mg/dL   Blood: x / Protein: 15 mg/dL / Nitrite: Negative   Leuk Esterase: Moderate / RBC: 3-5 /HPF / WBC 26-50   Sq Epi: x / Non Sq Epi: Moderate / Bacteria: x          MEDICATIONS  (STANDING):  diltiazem   CD 120milliGRAM(s) Oral daily  pantoprazole    Tablet 40milliGRAM(s) Oral before breakfast  sodium chloride 0.45% with potassium chloride 20 mEq/L 1000milliLiter(s) IV Continuous <Continuous>  potassium acid phosphate/sodium acid phosphate tablet (K-PHOS No. 2) 1Tablet(s) Oral four times a day with meals  magnesium sulfate  IVPB 1Gram(s) IV Intermittent every 8 hours  potassium chloride    Tablet ER 40milliEquivalent(s) Oral once  magnesium sulfate  IVPB 1Gram(s) IV Intermittent once  acetylcysteine IVPB 7Gram(s) IV Intermittent once    MEDICATIONS  (PRN):  ondansetron Injectable 4milliGRAM(s) IV Push every 6 hours PRN Nausea and/or Vomiting  LORazepam   Injectable 1milliGRAM(s) IV Push every 1 hour PRN CIWA-Ar score 8 or greater      RADIOLOGY & ADDITIONAL TEST
Patient is a 71y old  Female who presents with a chief complaint of Tylenol overdosage, suicidal ideation (23 Apr 2017 13:54)      HPI:  Patient was seen today with aide a bedside.  I informed pt that LFT are getting better and she  was tearful and said " I dont care , I want to go home". No nausea , no vomiting. Mucomist complete. No abodminal pain      REVIEW OF SYSTEMS:  Constitutional: No fever, weight loss or fatigue  ENMT:  No difficulty hearing, tinnitus, vertigo; No sinus or throat pain  Respiratory: No cough, wheezing, chills or hemoptysis  Cardiovascular: No chest pain, palpitations, dizziness or leg swelling  Gastrointestinal: No abdominal or epigastric pain. No nausea, vomiting or hematemesis; No diarrhea or constipation. No melena or hematochezia.  Skin: No itching, burning, rashes or lesions   Musculoskeletal: No joint pain or swelling; No muscle, back or extremity pain    PAST MEDICAL & SURGICAL HISTORY:  Suicidal behavior with attempted self-injury  Bipolar 1 disorder  Rotator cuff dysfunction  Carpal tunnel syndrome      FAMILY HISTORY:  Family history of diabetes mellitus (Father, Mother)      SOCIAL HISTORY:  Smoking Status: [ ] Current, [ ] Former, [ ] Never  Pack Years:  [  ] EtOH  [  ] IVDA    MEDICATIONS:  MEDICATIONS  (STANDING):  diltiazem   CD 120milliGRAM(s) Oral daily  pantoprazole    Tablet 40milliGRAM(s) Oral before breakfast  potassium acid phosphate/sodium acid phosphate tablet (K-PHOS No. 2) 1Tablet(s) Oral four times a day with meals  magnesium sulfate  IVPB 1Gram(s) IV Intermittent every 8 hours  phytonadione   Solution 5milliGRAM(s) Oral daily  LORazepam     Tablet 0.5milliGRAM(s) Oral two times a day  potassium chloride    Tablet ER 40milliEquivalent(s) Oral every 4 hours    MEDICATIONS  (PRN):  ondansetron Injectable 4milliGRAM(s) IV Push every 6 hours PRN Nausea and/or Vomiting  LORazepam   Injectable 1milliGRAM(s) IV Push every 1 hour PRN CIWA-Ar score 8 or greater      Allergies    Allergy Status Unknown    Intolerances        Vital Signs Last 24 Hrs  T(C): 36.7, Max: 36.9 (04-25 @ 12:08)  T(F): 98, Max: 98.5 (04-25 @ 12:08)  HR: 55 (55 - 90)  BP: 113/53 (102/54 - 142/96)  BP(mean): 72 (67 - 72)  RR: 15 (15 - 22)  SpO2: 96% (96% - 100%)    I & Os for current day (as of 04-26 @ 11:59)  =============================================  IN: 1545 ml / OUT: 0 ml / NET: 1545 ml        PHYSICAL EXAM:    General: Well developed; well nourished; in no acute distress  HEENT: MMM, conjunctiva and sclera clear  Gastrointestinal: Soft, non-tender non-distended; Normal bowel sounds; No rebound or guarding  Extremities: Normal range of motion, No clubbing, cyanosis or edema  Neurological: Alert and oriented x3  Skin: Warm and dry. No obvious rash      LABS:                        11.6   5.5   )-----------( 216      ( 26 Apr 2017 07:59 )             36.0     26 Apr 2017 07:59    136    |  104    |  11.0   ----------------------------<  108    3.4     |  20.0   |  0.76     Ca    8.7        26 Apr 2017 07:59  Phos  1.7       24 Apr 2017 21:43  Mg     2.5       26 Apr 2017 09:37    TPro  5.4    /  Alb  2.6    /  TBili  0.9    /  DBili  0.3    /  AST  253    /  ALT  418    /  AlkPhos  66     / Amylase x      /Lipase x      26 Apr 2017 07:59    hepA, B, C normal. anti nuclear antibody, anti mitochondrial antibody negative.               RADIOLOGY & ADDITIONAL STUDIES:
Patient seen and examined.  Slightly agitated.  Refusing further infusions of magnesium.  Last 2 levels were normal:  2.5 and 2.3.    No further IV fluids.  No BM in last 4 days.  Received 1 dose of Miralax and stool softener yesterday.  No results.  Not  eating very much. DASH diet in place. Denies abdominal pain, nausea or fever. BW was just drawn.       PAST MEDICAL & SURGICAL HISTORY:  Suicidal behavior with attempted self-injury  Bipolar 1 disorder  Rotator cuff dysfunction  Carpal tunnel syndrome      ROS:  No Heartburn, regurgitation, dysphagia, odynophagia.  No dyspepsia  No abdominal pain.    No Nausea, vomiting.  No Bleeding.  No hematemesis.   No diarrhea.    No hematochesia.  No weight loss, anorexia.  No edema.      MEDICATIONS  (STANDING):  diltiazem   CD 120milliGRAM(s) Oral daily  pantoprazole    Tablet 40milliGRAM(s) Oral before breakfast  potassium acid phosphate/sodium acid phosphate tablet (K-PHOS No. 2) 1Tablet(s) Oral four times a day with meals  magnesium sulfate  IVPB 1Gram(s) IV Intermittent every 8 hours  phytonadione   Solution 5milliGRAM(s) Oral daily  LORazepam     Tablet 0.5milliGRAM(s) Oral two times a day  polyethylene glycol 3350 17Gram(s) Oral daily  docusate sodium 100milliGRAM(s) Oral two times a day    MEDICATIONS  (PRN):  ondansetron Injectable 4milliGRAM(s) IV Push every 6 hours PRN Nausea and/or Vomiting  LORazepam   Injectable 1milliGRAM(s) IV Push every 1 hour PRN CIWA-Ar score 8 or greater      Allergies    Allergy Status Unknown      Vital Signs Last 24 Hrs  T(C): 36.8, Max: 37.1 (04-26 @ 08:12)  T(F): 98.3, Max: 98.7 (04-26 @ 08:12)  HR: 61 (55 - 73)  BP: 132/67 (113/53 - 135/88)  BP(mean): 84 (72 - 100)  RR: 14 (12 - 20)  SpO2: 97% (95% - 98%)    PHYSICAL EXAM:    GENERAL: NAD, well-groomed, well-developed  HEAD:  Atraumatic, Normocephalic  EYES: EOMI, PERRLA, conjunctiva and sclera clear  ENMT: No tonsillar erythema, exudates, or enlargement; Moist mucous membranes, Good dentition, No lesions  NECK: Supple, No JVD, Normal thyroid  CHEST/LUNG: Clear to percussion bilaterally; No rales, rhonchi, wheezing, or rubs  HEART: Regular rate and rhythm; No murmurs, rubs, or gallops  ABDOMEN: Soft, Nontender, Nondistended; Bowel sounds present;  No HSM.  No MTR.    TOM:  normal.  No lesions,  no hemorrhoids. Scant brown stool.   EXTREMITIES:  2+ Peripheral Pulses, No clubbing, cyanosis, or edema  LYMPH: No lymphadenopathy noted  SKIN: No rashes or lesions      LABS:                        11.6   5.5   )-----------( 216      ( 26 Apr 2017 07:59 )             36.0     04-26    136  |  104  |  11.0  ----------------------------<  108  3.4<L>   |  20.0<L>  |  0.76    Ca    8.7      26 Apr 2017 07:59  Mg     2.3     04-26    TPro  6.0<L>  /  Alb  3.0<L>  /  TBili  0.9  /  DBili  0.3  /  AST  225<H>  /  ALT  412<H>  /  AlkPhos  75  04-26    PT/INR - ( 26 Apr 2017 09:37 )   PT: 12.1 sec;   INR: 1.10 ratio                  RADIOLOGY & ADDITIONAL STUDIES:
Patient seen and examined.  Still c/o vague diffuse abdominal pain.  no Fever, confusion or bleeding.  Completed the Mucomyst uneventfully.  Mild Nausea persists.  No rash.  No BM's.  No icterus or pruritis. 1:1 in place in ER.        PAST MEDICAL & SURGICAL HISTORY:  Suicidal behavior with attempted self-injury  Bipolar 1 disorder  Rotator cuff dysfunction  Carpal tunnel syndrome      ROS:  No Heartburn, regurgitation, dysphagia, odynophagia.  No dyspepsia  No abdominal pain.    No Nausea, vomiting.  No Bleeding.  No hematemesis.   No diarrhea.    No hematochesia.  No weight loss, anorexia.  No edema.      MEDICATIONS  (STANDING):  sodium chloride 0.9%. 1000milliLiter(s) IV Continuous <Continuous>  diltiazem   CD 120milliGRAM(s) Oral daily  potassium chloride    Tablet ER 40milliEquivalent(s) Oral once  acetylcysteine IVPB 7Gram(s) IV Intermittent once    MEDICATIONS  (PRN):  ondansetron Injectable 4milliGRAM(s) IV Push every 6 hours PRN Nausea and/or Vomiting  LORazepam   Injectable 1milliGRAM(s) IV Push every 1 hour PRN CIWA-Ar score 8 or greater      Allergies    Allergy Status Unknown    Intolerances        Vital Signs Last 24 Hrs  T(C): 37.2, Max: 37.2 (04-24 @ 10:54)  T(F): 98.9, Max: 98.9 (04-24 @ 10:54)  HR: 90 (72 - 90)  BP: 147/85 (126/68 - 173/80)  BP(mean): --  RR: 20 (20 - 20)  SpO2: 97% (96% - 99%)    PHYSICAL EXAM:    GENERAL: NAD, well-groomed, well-developed  HEAD:  Atraumatic, Normocephalic  EYES: EOMI, PERRLA, conjunctiva and sclera clear  ENMT: No tonsillar erythema, exudates, or enlargement; Moist mucous membranes, Good dentition, No lesions  NECK: Supple, No JVD, Normal thyroid  CHEST/LUNG: Clear to percussion bilaterally; No rales, rhonchi, wheezing, or rubs  HEART: Regular rate and rhythm; No murmurs, rubs, or gallops  ABDOMEN: Soft, Mild diffuse tenderness, Nondistended; Bowel sounds present.  no scars.    EXTREMITIES:  2+ Peripheral Pulses, No clubbing, cyanosis, or edema  LYMPH: No lymphadenopathy noted  SKIN: No rashes or lesions      LABS:                        15.0   9.8   )-----------( 285      ( 2017 05:56 )             46.1         141  |  102  |  21.0<H>  ----------------------------<  129<H>  3.4<L>   |  20.0<L>  |  1.65<H>    Ca    9.6      2017 05:56  Phos  2.8       Mg     2.0         TPro  6.9  /  Alb  3.7  /  TBili  1.0  /  DBili  x   /  AST  827<H>  /  ALT  485<H>  /  AlkPhos  88  -24    PT/INR - ( 2017 05:56 )   PT: 16.0 sec;   INR: 1.44 ratio         PTT - ( 2017 05:56 )  PTT:32.8 sec   Urinalysis Basic - ( 2017 08:20 )    Color: Yellow / Appearance: Clear / S.020 / pH: x  Gluc: x / Ketone: Small  / Bili: Negative / Urobili: Negative mg/dL   Blood: x / Protein: 15 mg/dL / Nitrite: Negative   Leuk Esterase: Moderate / RBC: 3-5 /HPF / WBC 26-50   Sq Epi: x / Non Sq Epi: Moderate / Bacteria: x          RADIOLOGY & ADDITIONAL STUDIES:
Pt seen and examined. She is in NAD. Her liver chemistries are noted and are improving. No GI complaints offered.     MEDICATIONS:  MEDICATIONS  (STANDING):  diltiazem   CD 120milliGRAM(s) Oral daily  pantoprazole    Tablet 40milliGRAM(s) Oral before breakfast  potassium acid phosphate/sodium acid phosphate tablet (K-PHOS No. 2) 1Tablet(s) Oral four times a day with meals  magnesium sulfate  IVPB 1Gram(s) IV Intermittent every 8 hours  acetylcysteine IVPB 7Gram(s) IV Intermittent once  phytonadione   Solution 5milliGRAM(s) Oral daily    MEDICATIONS  (PRN):  ondansetron Injectable 4milliGRAM(s) IV Push every 6 hours PRN Nausea and/or Vomiting  LORazepam   Injectable 1milliGRAM(s) IV Push every 1 hour PRN CIWA-Ar score 8 or greater      Allergies    Allergy Status Unknown    Intolerances        Vital Signs Last 24 Hrs  T(C): 36.7, Max: 37.3 ( @ 00:08)  T(F): 98, Max: 99.1 ( @ 00:08)  HR: 61 (61 - 84)  BP: 93/58 (93/58 - 146/82)  BP(mean): --  RR: 16 (16 - 20)  SpO2: 96% (96% - 100%)    I & Os for current day (as of  @ 11:54)  =============================================  IN: 822.3 ml / OUT: 1450 ml / NET: -627.7 ml      PHYSICAL EXAM:    General: Well developed; well nourished; in no acute distress  HEENT: MMM, conjunctiva and sclera anicteric  Lungs: clear to auscultation and percussion.  Cor: RRR S1, S2 only w/o mrg or clicks  Gastrointestinal: Abdomen: Soft non-tender non-distended; Normal bowel sounds; No hepatosplenomegaly  no surgical scars.  Extremities: no cyanosis, clubbing or edema.  Skin: Warm and dry. No obvious rash  Neuro: Pt. a + o x 3, no tremulousness or asterixsis    LABS:      CBC Full  -  ( 2017 08:32 )  WBC Count : 6.9 K/uL  Hemoglobin : 11.0 g/dL  Hematocrit : 33.9 %  Platelet Count - Automated : 232 K/uL  Mean Cell Volume : 85.6 fl  Mean Cell Hemoglobin : 27.8 pg  Mean Cell Hemoglobin Concentration : 32.4 g/dL  Auto Neutrophil # : x  Auto Lymphocyte # : x  Auto Monocyte # : x  Auto Eosinophil # : x  Auto Basophil # : x  Auto Neutrophil % : x  Auto Lymphocyte % : x  Auto Monocyte % : x  Auto Eosinophil % : x  Auto Basophil % : x        138  |  105  |  12.0  ----------------------------<  113  3.2<L>   |  21.0<L>  |  0.78    Ca    8.9      2017 08:30  Phos  1.7     -  Mg     1.7         TPro  5.3<L>  /  Alb  2.7<L>  /  TBili  1.0  /  DBili  0.4<H>  /  AST  578<H>  /  ALT  554<H>  /  AlkPhos  66  -25    PT/INR - ( 2017 08:29 )   PT: 16.6 sec;   INR: 1.50 ratio         PTT - ( 2017 05:56 )  PTT:32.8 sec      Urinalysis Basic - ( 2017 08:20 )    Color: Yellow / Appearance: Clear / S.020 / pH: x  Gluc: x / Ketone: Small  / Bili: Negative / Urobili: Negative mg/dL   Blood: x / Protein: 15 mg/dL / Nitrite: Negative   Leuk Esterase: Moderate / RBC: 3-5 /HPF / WBC 26-50   Sq Epi: x / Non Sq Epi: Moderate / Bacteria: x                RADIOLOGY & ADDITIONAL STUDIES (The following images were personally reviewed):
Renal :    Pt seen and examined. She is in NAD.     F.U : SURINDER ( Pre - Renal ) Acetaminophen Overdose,    MEDICATIONS:  diltiazem   CD 120milliGRAM(s) Oral daily  pantoprazole    Tablet 40milliGRAM(s) Oral before breakfast  potassium acid phosphate/sodium acid phosphate tablet (K-PHOS No. 2) 1Tablet(s) Oral four times a day with meals  magnesium sulfate  IVPB 1Gram(s) IV Intermittent every 8 hours  acetylcysteine IVPB 7Gram(s) IV Intermittent once  phytonadione   Solution 5milliGRAM(s) Oral daily    MEDICATIONS  (PRN):  ondansetron Injectable 4milliGRAM(s) IV Push every 6 hours PRN Nausea and/or Vomiting  LORazepam   Injectable 1milliGRAM(s) IV Push every 1 hour PRN CIWA-Ar score 8 or greater      Allergies    Status Unknown    Vital Signs Last 24 Hrs  T(C): 36.7, Max: 37.3 ( @ 00:08)  T(F): 98, Max: 99.1 ( @ 00:08)  HR: 61 (61 - 84)  BP: 93/58 (93/58 - 146/82)  BP(mean): --  RR: 16 (16 - 20)  SpO2: 96% (96% - 100%)    I & Os for current day (as of  @ 11:54)  =============================================  IN: 822.3 ml / OUT: 1450 ml / NET: -627.7 ml      PHYSICAL EXAM:    General: Well developed; well nourished; in no acute distress  HEENT: MMM, conjunctiva and sclera anicteric  Lungs: clear to auscultation and percussion.  Cor: S1, S2 , No  murmur or clicks  Gastrointestinal: Abdomen: Soft non-tender non-distended; Normal bowel sounds; No hepatosplenomegaly  no surgical scars.  Extremities: no cyanosis, clubbing or edema.  Skin: Warm and dry. No obvious rash  Neuro: Pt. a + o x 3, no tremulousness or  asterixis    LABS:      CBC Full  -  ( 2017 08:32 )  WBC Count : 6.9 K/uL  Hemoglobin : 11.0 g/dL  Hematocrit : 33.9 %  Platelet Count - Automated : 232 K/uL  Mean Cell Volume : 85.6 fl  Mean Cell Hemoglobin : 27.8 pg  Mean Cell Hemoglobin Concentration : 32.4 g/dL  Auto Neutrophil # : x  Auto Lymphocyte # : x  Auto Monocyte # : x  Auto Eosinophil # : x  Auto Basophil # : x  Auto Neutrophil % : x  Auto Lymphocyte % : x  Auto Monocyte % : x  Auto Eosinophil % : x  Auto Basophil % : x        138  |  105  |  12.0  ----------------------------<  113  3.2<L>   |  21.0<L>  |  0.78    Ca    8.9      2017 08:30  Phos  1.7       Mg     1.7         TPro  5.3<L>  /  Alb  2.7<L>  /  TBili  1.0  /  DBili  0.4<H>  /  AST  578<H>  /  ALT  554<H>  /  AlkPhos  66      PT/INR - ( 2017 08:29 )   PT: 16.6 sec;   INR: 1.50 ratio         PTT - ( 2017 05:56 )  PTT:32.8 sec      Urinalysis Basic - ( 2017 08:20 )    Color: Yellow / Appearance: Clear / S.020 / pH: x  Gluc: x / Ketone: Small  / Bili: Negative / Urobili: Negative mg/dL   Blood: x / Protein: 15 mg/dL / Nitrite: Negative   Leuk Esterase: Moderate / RBC: 3-5 /HPF / WBC 26-50   Sq Epi: x / Non Sq Epi: Moderate / Bacteria: x    RADIOLOGY:    EXAM:  US ABDOMEN COMPLETE                          PROCEDURE DATE:  2017        INTERPRETATION:  CLINICAL INFORMATION: Chronic overdose, abnormal hepatic   and renal function    COMPARISON: None available.    TECHNIQUE: Sonography of the abdomen.     FINDINGS:    Aorta and IVC: Visualized portions are within normal limits.  Liver: 12.5 cm, increased echogenicity.  Gallbladder: Within normal limits.      Bile ducts: Normal caliber. Common hepatic duct measures 5 mm.   Pancreas: Not visualized  Right kidney: 7.7 cm. No hydronephrosis. 1.7 cm lower pole cyst.  Left kidney: 8.3 cm. No hydronephrosis.   Spleen: 8.3 cm. Within normal limits.  Ascites: None.    IMPRESSION:     Increased liver echogenicity. Small kidneys without hydronephrosis.

## 2017-04-27 NOTE — PROGRESS NOTE ADULT - PROBLEM SELECTOR PLAN 2
Hepatitis - Continue therapy per <
Likely responsible for abnormal liver chemistries.
As above.
Moderate elevations.  No coagulopathy.  Normal Platelets.  Trend results.  Continue supportive therapy.  [place] of diet.  DASH.
per ,

## 2017-04-27 NOTE — PROGRESS NOTE ADULT - PROVIDER SPECIALTY LIST ADULT
Gastroenterology
Hospitalist
Nephrology
Hospitalist
Nephrology
Nephrology

## 2017-04-27 NOTE — ED BEHAVIORAL HEALTH NOTE - BEHAVIORAL HEALTH NOTE
MAZINNote:phone call from [facility] (Thea) pt accepted by Dr Garza. Worker informed pt and pt's nurse(Sommer) both verbalized understanding. Billy arranged through  clerical(Jeanna) . Package with original legals and transport form left at nursing station for nurse Sommer. No other concerns at this moment.

## 2017-04-27 NOTE — PROGRESS NOTE ADULT - PROBLEM SELECTOR PLAN 3
Resolved.  Normal renal function.
Likely secondary to recent APAP OD and active ETOH abuse. Liver chemistries are improving. Repeat liver chemistries & Vitamin K ordered , per HM,
Per Psychiatry,

## 2017-04-27 NOTE — DISCHARGE NOTE ADULT - SECONDARY DIAGNOSIS.
Suicidal behavior with attempted self-injury LFTs abnormal SURINDER (acute kidney injury) Alcohol abuse Anxiety disorder, unspecified type

## 2017-04-27 NOTE — DISCHARGE NOTE ADULT - CARE PLAN
Principal Discharge DX:	Tylenol toxicity, intentional self-harm, initial encounter  Goal:	.  Instructions for follow-up, activity and diet:	Inpatient psych admission, further management as per psychiatry  Secondary Diagnosis:	Suicidal behavior with attempted self-injury  Secondary Diagnosis:	LFTs abnormal  Goal:	improving  Instructions for follow-up, activity and diet:	f/u LFT in 1 week  Secondary Diagnosis:	SURINDER (acute kidney injury)  Goal:	improved  Secondary Diagnosis:	Alcohol abuse  Goal:	counseled  Secondary Diagnosis:	Anxiety disorder, unspecified type  Instructions for follow-up, activity and diet:	management as per psychiatry

## 2017-05-15 ENCOUNTER — EMERGENCY (EMERGENCY)
Facility: HOSPITAL | Age: 72
LOS: 1 days | Discharge: HOME | End: 2017-05-15
Attending: EMERGENCY MEDICINE
Payer: MEDICARE

## 2017-05-15 VITALS
SYSTOLIC BLOOD PRESSURE: 149 MMHG | RESPIRATION RATE: 18 BRPM | HEIGHT: 61 IN | OXYGEN SATURATION: 97 % | DIASTOLIC BLOOD PRESSURE: 99 MMHG | WEIGHT: 121.92 LBS | TEMPERATURE: 99 F | HEART RATE: 100 BPM

## 2017-05-15 DIAGNOSIS — F31.9 BIPOLAR DISORDER, UNSPECIFIED: ICD-10-CM

## 2017-05-15 DIAGNOSIS — Z59.0 HOMELESSNESS: ICD-10-CM

## 2017-05-15 DIAGNOSIS — Z86.69 PERSONAL HISTORY OF OTHER DISEASES OF THE NERVOUS SYSTEM AND SENSE ORGANS: ICD-10-CM

## 2017-05-15 DIAGNOSIS — Z79.899 OTHER LONG TERM (CURRENT) DRUG THERAPY: ICD-10-CM

## 2017-05-15 DIAGNOSIS — F43.9 REACTION TO SEVERE STRESS, UNSPECIFIED: ICD-10-CM

## 2017-05-15 PROCEDURE — 90792 PSYCH DIAG EVAL W/MED SRVCS: CPT

## 2017-05-15 PROCEDURE — 99284 EMERGENCY DEPT VISIT MOD MDM: CPT

## 2017-05-15 SDOH — ECONOMIC STABILITY - HOUSING INSECURITY: HOMELESSNESS: Z59.0

## 2017-05-15 NOTE — ED ADULT TRIAGE NOTE - CHIEF COMPLAINT QUOTE
Pt "had argument with daughter tonight and she wants me to be back in [facility] so I had to come here", pt with previous admission to [facility] for 15 days for overdose, denies SI/HI at this time

## 2017-05-15 NOTE — ED PROVIDER NOTE - NS ED MD SCRIBE ATTENDING SCRIBE SECTIONS
VITAL SIGNS( Pullset)/PHYSICAL EXAM/HIV/REVIEW OF SYSTEMS/HISTORY OF PRESENT ILLNESS/PAST MEDICAL/SURGICAL/SOCIAL HISTORY/DISPOSITION
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
## 2017-05-15 NOTE — ED ADULT NURSE NOTE - CHIEF COMPLAINT QUOTE
Pt "had argument with daughter tonight and she wants me to be back in [facility] so I had to come here", pt with previous admission to [facility] for 15 days for overdose, denies SI/HI at this time
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
## 2017-05-16 VITALS
OXYGEN SATURATION: 98 % | RESPIRATION RATE: 16 BRPM | TEMPERATURE: 98 F | SYSTOLIC BLOOD PRESSURE: 167 MMHG | DIASTOLIC BLOOD PRESSURE: 92 MMHG | HEART RATE: 75 BPM

## 2017-05-16 DIAGNOSIS — F39 UNSPECIFIED MOOD [AFFECTIVE] DISORDER: ICD-10-CM

## 2017-05-16 PROBLEM — M67.919 UNSPECIFIED DISORDER OF SYNOVIUM AND TENDON, UNSPECIFIED SHOULDER: Chronic | Status: ACTIVE | Noted: 2017-04-23

## 2017-05-16 PROBLEM — G56.00 CARPAL TUNNEL SYNDROME, UNSPECIFIED UPPER LIMB: Chronic | Status: ACTIVE | Noted: 2017-04-23

## 2017-05-16 LAB
ALBUMIN SERPL ELPH-MCNC: 3.6 G/DL — SIGNIFICANT CHANGE UP (ref 3.3–5.2)
ALP SERPL-CCNC: 57 U/L — SIGNIFICANT CHANGE UP (ref 40–120)
ALT FLD-CCNC: 12 U/L — SIGNIFICANT CHANGE UP
AMPHET UR-MCNC: NEGATIVE — SIGNIFICANT CHANGE UP
ANION GAP SERPL CALC-SCNC: 12 MMOL/L — SIGNIFICANT CHANGE UP (ref 5–17)
AST SERPL-CCNC: 13 U/L — SIGNIFICANT CHANGE UP
BARBITURATES UR SCN-MCNC: NEGATIVE — SIGNIFICANT CHANGE UP
BENZODIAZ UR-MCNC: NEGATIVE — SIGNIFICANT CHANGE UP
BILIRUB SERPL-MCNC: 0.3 MG/DL — LOW (ref 0.4–2)
BUN SERPL-MCNC: 12 MG/DL — SIGNIFICANT CHANGE UP (ref 8–20)
CALCIUM SERPL-MCNC: 8.5 MG/DL — LOW (ref 8.6–10.2)
CHLORIDE SERPL-SCNC: 109 MMOL/L — HIGH (ref 98–107)
CO2 SERPL-SCNC: 22 MMOL/L — SIGNIFICANT CHANGE UP (ref 22–29)
COCAINE METAB.OTHER UR-MCNC: NEGATIVE — SIGNIFICANT CHANGE UP
CREAT SERPL-MCNC: 0.88 MG/DL — SIGNIFICANT CHANGE UP (ref 0.5–1.3)
ETHANOL SERPL-MCNC: <10 MG/DL — SIGNIFICANT CHANGE UP
GLUCOSE SERPL-MCNC: 127 MG/DL — HIGH (ref 70–115)
HCT VFR BLD CALC: 34.7 % — LOW (ref 37–47)
HGB BLD-MCNC: 11.1 G/DL — LOW (ref 12–16)
MCHC RBC-ENTMCNC: 28.1 PG — SIGNIFICANT CHANGE UP (ref 27–31)
MCHC RBC-ENTMCNC: 32 G/DL — SIGNIFICANT CHANGE UP (ref 32–36)
MCV RBC AUTO: 87.8 FL — SIGNIFICANT CHANGE UP (ref 81–99)
METHADONE UR-MCNC: NEGATIVE — SIGNIFICANT CHANGE UP
OPIATES UR-MCNC: NEGATIVE — SIGNIFICANT CHANGE UP
PCP SPEC-MCNC: SIGNIFICANT CHANGE UP
PCP UR-MCNC: NEGATIVE — SIGNIFICANT CHANGE UP
PLATELET # BLD AUTO: 242 K/UL — SIGNIFICANT CHANGE UP (ref 150–400)
POTASSIUM SERPL-MCNC: 3.1 MMOL/L — LOW (ref 3.5–5.3)
POTASSIUM SERPL-SCNC: 3.1 MMOL/L — LOW (ref 3.5–5.3)
PROT SERPL-MCNC: 6.2 G/DL — LOW (ref 6.6–8.7)
RBC # BLD: 3.95 M/UL — LOW (ref 4.4–5.2)
RBC # FLD: 17.3 % — HIGH (ref 11–15.6)
SODIUM SERPL-SCNC: 143 MMOL/L — SIGNIFICANT CHANGE UP (ref 135–145)
THC UR QL: NEGATIVE — SIGNIFICANT CHANGE UP
WBC # BLD: 5.9 K/UL — SIGNIFICANT CHANGE UP (ref 4.8–10.8)
WBC # FLD AUTO: 5.9 K/UL — SIGNIFICANT CHANGE UP (ref 4.8–10.8)

## 2017-05-16 PROCEDURE — 80307 DRUG TEST PRSMV CHEM ANLYZR: CPT

## 2017-05-16 PROCEDURE — 85027 COMPLETE CBC AUTOMATED: CPT

## 2017-05-16 PROCEDURE — 99285 EMERGENCY DEPT VISIT HI MDM: CPT

## 2017-05-16 PROCEDURE — 80053 COMPREHEN METABOLIC PANEL: CPT
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
## 2017-05-16 NOTE — ED BEHAVIORAL HEALTH ASSESSMENT NOTE - DESCRIPTION
Patient was calm, cooperative, and able to give a coherent well organized account.  Patient exhibited irritability directed towards daughter, however was appropriate with writer during interview.  She did not appear to be responding to internal stimuli. Carpal tunnel syndrome;  Rotator  cuff dysfunction, GERD, HTN Lives with daughter

## 2017-05-16 NOTE — ED BEHAVIORAL HEALTH ASSESSMENT NOTE - RISK ASSESSMENT
Low-Pt denies depressive, manic, or psychotic sx's, denies any S/H I/I/P, maintains sobriety of ETOH, patient, remains future oriented, is able to engage in problem solving, and is able to contract for safety with no demonstrable ambivalence h/o recent suicide attempt, and h/o ETOH use do increase her chronic reisk

## 2017-05-16 NOTE — ED BEHAVIORAL HEALTH ASSESSMENT NOTE - OTHER PAST PSYCHIATRIC HISTORY (INCLUDE DETAILS REGARDING ONSET, COURSE OF ILLNESS, INPATIENT/OUTPATIENT TREATMENT)
Currently denying any prior psychiatric treatment.  She was recently in [facility] for 2 weeks after a suicide attempts.  She reports no prior suicide attempts. Unclear prior psychiatric history.  Patient reports she was diagnosed as bipolar disorder after her most recent hositalization. She denies any clear h/o manic episodes.

## 2017-05-16 NOTE — ED BEHAVIORAL HEALTH ASSESSMENT NOTE - CURRENT MEDICATION
Depakote 500 mg daily , Vistaril 50 mg QHS PRN insomnia, Omeprazole 20 mg daily , Diltiazem 30 mg PO Daily

## 2017-05-16 NOTE — ED BEHAVIORAL HEALTH ASSESSMENT NOTE - DESCRIPTION (FIRST USE, LAST USE, QUANTITY, FREQUENCY, DURATION)
reports she was drinking 2-3 glasses of wine daily prior to last hospitalization but none since discharge from [facility]

## 2017-05-16 NOTE — ED BEHAVIORAL HEALTH ASSESSMENT NOTE - DETAILS
Pt. ingested #50 Percocet 5/325, Klonopin, Ambien prior to last psychiatric hospitalization Mother attempted suicide Recently discharged from [facility] one weeks ago. not contributory see social work note

## 2017-05-16 NOTE — ED BEHAVIORAL HEALTH NOTE - BEHAVIORAL HEALTH NOTE
SW Note: Met with pt when her son in law brought up some of her clothes, pursue, home medications and discharge instructions form [facility] Pt can no longer stay with family due to arguments between them. Pt [place] of [place] live with her dtr approx. 1.5 months ago. She has her own automobile on [address] but it is not registered to be on the road. She is familiar with this area, lived here years ago. She has access to her funds, debit card and has a personal check on her from sale of her home that she needs to go to the bank and deposit. She plans to stay in a local motel till she makes future plans for housing, she might relocate again. She requested in fo on a specific motel in [place]. Info provided with a taxi voucher. Pt has a cell phone and made a call to a friend but unfortunately that friend could not help her with housing issue. Reports she has no other family to contact.  Pts son in law was aware that pt will be given help to get to a motel from the ER and that SW did not have any other housing options to offer. Family stated that they will not change their minds but that they will hold on to the rest of her belongings and care for her dog till she is situated.  Reviewed pts aftercare plans form [facility]. Pt was given a homecare referral with [facility]. pt stated they came to her dtrs home but that she declined services. She has a follow up appt with a psychiatrist and has appt date and time, address and phone # to office in her papers. Pt is not endorsing any thoughts of self harm towards self or others. Requested psych to see prior to release.   Pt has number for APS, Crisis hotline and other crisis numbers, [facility] community resource sheet.
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
## 2017-05-16 NOTE — ED BEHAVIORAL HEALTH ASSESSMENT NOTE - SUMMARY
Patient is a  71 year old, male; living with daughter and her family ; twice  (currently  >10 years); noncaregiver; recently diagnosed with bipolar disorder (during recent hopsitalization), with one recent life time hospitalization following a suicide attempt,  no other prior psychiatric history;  no known history of violence or arrests; no active substance abuse or known history of complicated withdrawal; PMH of GERD, HTN; brought in by family for evaluation because they cannot tolerate living with her anymore. Patient was discharged from [facility]  a week ago after 2 week stay.  She reported complete resolution of any suicidal thoughts. Currently patient denies symptoms consistent with landon, psychosis or depression.  Reports poor relationship with daughter, and argument last night that precipitated current visit to ER.  Patient gave well organized history. She was calm and appropriate during interview with writer, with irritabiliyt directed towards daughter. No reported suicidal statements or actions. Patient reports compliance with Depakote .Currently patient does not meet criteria for inpatient psychiatric hospitalization. She has follow up (given by [facility]) with Dr. Moses on June 2nd.  Current plan is for patient to be disharged to [place] as patient has access to money and agrees with plan.

## 2017-12-01 NOTE — ED BEHAVIORAL HEALTH ASSESSMENT NOTE - NS ED [facility] ED COURSE FOUR POINT RESTRAINTS IN ED YN

## 2019-02-19 NOTE — ED PROVIDER NOTE - CROS ED RESP ALL NEG
For information on Fall & Injury Prevention, visit [email or web address]...

## 2019-05-01 NOTE — ED ADULT NURSE REASSESSMENT NOTE - ED CARDIAC HEART SOUNDS
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

## 2020-01-01 NOTE — ED BEHAVIORAL HEALTH ASSESSMENT NOTE - DEPENDENTS
None known Jeffrey Peña  [address] [place] [address]  Phone: [phone]  Fax: (   )    -  Scheduled Appointment: 2020

## 2020-01-10 NOTE — ED BEHAVIORAL HEALTH ASSESSMENT NOTE - PRIMARY DX
-- DO NOT REPLY / DO NOT REPLY ALL --  -- Message is from the [facility]--    Referral Request  Imaging: MRI left knee w/o contrast      Medical condition for referral:  Left knee   Please contact once completed - appt set for 01/13/20    Is this a NEW request?: yes      Referral ordered by: Dr Alexei Garcia       Insurance type:       Payor: [employer] - [employer]ATE / Plan: [employer] - [employer]VOC[employer] / Product Type: [facility]    Preferred Delivery Method   Fax - number to send to: [facility]     Caller Information       Type Contact Phone    01/10/2020 11:01 AM Phone (Incoming) NATALIYA GOODWIN (Emergency Contact) [phone]        Turnaround time given to caller:   \"This message will be sent to [state Provider's full name]. The clinical team will return your call as soon as they review your message. Typically, it takes 3 business days to process referral requests.\"   Mood disorder Deferred diagnosis on axis II

## 2020-07-18 NOTE — PROGRESS NOTE ADULT - ASSESSMENT
Blood cultures x2 drawn and sent to lab with lactic acid.    Tylenol Overdose;  Elevated LFT;s, all static.  Repeats are pending.  Coags improved,  INR 1.5 to 1.1.  Bili and AP are normal.  Albumin increased to 3.0.

## 2021-08-02 NOTE — ED ADULT NURSE REASSESSMENT NOTE - TEMPLATE LIST FOR HEAD TO TOE ASSESSMENT
General - hsTrop 310 on admission which may be due to demand ischemia  - c/w statin, off AC/ASA d/t bleeding  - BB as above and will need eventual ischemic eval

## 2021-12-04 NOTE — PATIENT PROFILE ADULT. - NSSUBSTANCEUSE_GEN_ALL_CORE_SD
Major depression Major depression Hypertension Major depression street drug/inhalant/medication abuse Anemia Anemia Anemia

## 2024-06-20 NOTE — PATIENT PROFILE ADULT. - TOBACCO USE
Wet to dry Dressing change completed on Left foot and right shoulder. While doing dressing change noticed patient was diaphoretic gown, bed pillow case , pillow soaked skin very wet. Pt stated he got really hot all the sudden and then cold. Took Pt's temp multiple times orally and axillary all were 93.0. Pt denied any negative symptoms just kept saying he felt good. Let charge nurse know . Perfect serve sent to Dr. Prabhakar . Dr. Prabhakar came up orders given.     Unknown if ever smoked

## 2025-08-03 NOTE — ED ADULT TRIAGE NOTE - AS O2 DELIVERY
Subjective   Patient ID: Emerita Shankar is a 73 y.o. female. They present today with a chief complaint of Cough, Nasal Congestion, Headache, and Sinusitis (Started last Sunday. She said she went to the [facility], and was sent here and told she has a rattling noise when breathing (4 days that she noticed)).    History of Present Illness  HPI    73-year-old female patient presents today for concerns of a productive cough, nasal congestion, headache, and increased sinus pressure that started 1 week ago.  She states that she is not having any difficulties breathing, but she did go to [facility] clinic this morning where she was advised to have a chest x-ray completed.  She is not having any abdominal pain, nausea, vomiting, constipation, or diarrhea.  She is here for evaluation.  Past Medical History  Allergies as of 08/03/2025    (No Known Allergies)       Prescriptions Prior to Admission[1]     Medical History[2]    Surgical History[3]     reports that she has been smoking cigarettes. She has never used smokeless tobacco. She reports current alcohol use of about 3.0 standard drinks of alcohol per week. She reports that she does not currently use drugs after having used the following drugs: Marijuana.    Review of Systems  Review of Systems   HENT:  Positive for congestion and sinus pressure.    Respiratory:  Positive for cough.                                   Objective    There were no vitals filed for this visit.  No LMP recorded.    Physical Exam    Cardiovascular:      Rate and Rhythm: Normal rate and regular rhythm.      Pulses: Normal pulses.      Heart sounds: Normal heart sounds.   Pulmonary:      Effort: Pulmonary effort is normal.      Breath sounds: Wheezing and rhonchi present.         Procedures    Point of Care Test & Imaging Results from this visit  No results found for this visit on 08/03/25.   Imaging  XR chest 2 views  Result Date: 8/3/2025  Small nodule or confluence of shadows in the left lung  base. Serial radiographic follow-up to assess persistence/stability and/or further evaluation with CT recommended.   No focal infiltrate.   MACRO: None.   Signed by: Cheli Xiong 8/3/2025 10:58 AM Dictation workstation:   [id number]      Cardiology, Vascular, and Other Imaging  No other imaging results found for the past 2 days      Diagnostic study results (if any) were reviewed by CHARMAINE Culver.    Assessment/Plan   Allergies, medications, history, and pertinent labs/EKGs/Imaging reviewed by CHARMAINE Culver.     Medical Decision Making  Chest x-ray completed in clinic; results were discussed with patient.  She is agreeable to a prescription for an antibiotic and prednisone be sent to her pharmacy-medication education provided.  Recommend following up with her primary care provider within 2 to 3 days.  Red flag symptoms discussed with patient.  She is hemodynamically stable and in no acute distress at the time of discharge.    Orders and Diagnoses  Diagnoses and all orders for this visit:  Chest congestion  -     XR chest 2 views; Future  Bacterial URI  -     amoxicillin-clavulanate (Augmentin) 875-125 mg tablet; Take 1 tablet (875 mg of amoxicillin) by mouth 2 times a day.  -     predniSONE (Deltasone) 20 mg tablet; Take 2 tablets (40 mg) by mouth once daily for 5 days.      Medical Admin Record      Patient disposition: Home    Electronically signed by CHAMRAINE Culver  1:17 PM           [1] (Not in a hospital admission)   [2] History reviewed. No pertinent past medical history.  [3] History reviewed. No pertinent surgical history.     room air